# Patient Record
Sex: MALE | Race: BLACK OR AFRICAN AMERICAN | NOT HISPANIC OR LATINO | Employment: UNEMPLOYED | URBAN - METROPOLITAN AREA
[De-identification: names, ages, dates, MRNs, and addresses within clinical notes are randomized per-mention and may not be internally consistent; named-entity substitution may affect disease eponyms.]

---

## 2024-01-09 ENCOUNTER — OFFICE VISIT (OUTPATIENT)
Dept: PRIMARY CARE CLINIC | Facility: CLINIC | Age: 29
End: 2024-01-09
Payer: MEDICAID

## 2024-01-09 ENCOUNTER — LAB VISIT (OUTPATIENT)
Dept: LAB | Facility: HOSPITAL | Age: 29
End: 2024-01-09
Attending: FAMILY MEDICINE
Payer: MEDICAID

## 2024-01-09 VITALS
HEIGHT: 65 IN | SYSTOLIC BLOOD PRESSURE: 156 MMHG | OXYGEN SATURATION: 99 % | WEIGHT: 315 LBS | BODY MASS INDEX: 52.48 KG/M2 | TEMPERATURE: 98 F | DIASTOLIC BLOOD PRESSURE: 76 MMHG | HEART RATE: 55 BPM

## 2024-01-09 DIAGNOSIS — E03.9 HYPOTHYROIDISM (ACQUIRED): ICD-10-CM

## 2024-01-09 DIAGNOSIS — Z11.59 ENCOUNTER FOR HEPATITIS C SCREENING TEST FOR LOW RISK PATIENT: ICD-10-CM

## 2024-01-09 DIAGNOSIS — Z11.3 SCREEN FOR STD (SEXUALLY TRANSMITTED DISEASE): ICD-10-CM

## 2024-01-09 DIAGNOSIS — Z11.4 ENCOUNTER FOR SCREENING FOR HIV: ICD-10-CM

## 2024-01-09 DIAGNOSIS — L64.9 MALE PATTERN BALDNESS: ICD-10-CM

## 2024-01-09 DIAGNOSIS — Z13.220 LIPID SCREENING: ICD-10-CM

## 2024-01-09 DIAGNOSIS — I10 ELEVATED BLOOD PRESSURE READING WITH DIAGNOSIS OF HYPERTENSION: ICD-10-CM

## 2024-01-09 DIAGNOSIS — Z13.1 ENCOUNTER FOR SCREENING FOR DIABETES MELLITUS: ICD-10-CM

## 2024-01-09 DIAGNOSIS — G43.819 OTHER MIGRAINE WITHOUT STATUS MIGRAINOSUS, INTRACTABLE: ICD-10-CM

## 2024-01-09 DIAGNOSIS — Z00.01 ENCOUNTER FOR GENERAL ADULT MEDICAL EXAMINATION WITH ABNORMAL FINDINGS: Primary | ICD-10-CM

## 2024-01-09 DIAGNOSIS — I10 ESSENTIAL HYPERTENSION: ICD-10-CM

## 2024-01-09 DIAGNOSIS — E66.01 CLASS 3 SEVERE OBESITY WITH SERIOUS COMORBIDITY AND BODY MASS INDEX (BMI) OF 50.0 TO 59.9 IN ADULT, UNSPECIFIED OBESITY TYPE: ICD-10-CM

## 2024-01-09 PROBLEM — E66.813 CLASS 3 SEVERE OBESITY WITH SERIOUS COMORBIDITY AND BODY MASS INDEX (BMI) OF 50.0 TO 59.9 IN ADULT: Status: ACTIVE | Noted: 2024-01-09

## 2024-01-09 PROBLEM — G43.919 INTRACTABLE MIGRAINE WITHOUT STATUS MIGRAINOSUS: Status: ACTIVE | Noted: 2024-01-09

## 2024-01-09 LAB
BASOPHILS # BLD AUTO: 0.02 K/UL (ref 0–0.2)
BASOPHILS NFR BLD: 0.4 % (ref 0–1.9)
DIFFERENTIAL METHOD BLD: ABNORMAL
EOSINOPHIL # BLD AUTO: 0.1 K/UL (ref 0–0.5)
EOSINOPHIL NFR BLD: 1.4 % (ref 0–8)
ERYTHROCYTE [DISTWIDTH] IN BLOOD BY AUTOMATED COUNT: 12.2 % (ref 11.5–14.5)
ESTIMATED AVG GLUCOSE: 100 MG/DL (ref 68–131)
HBA1C MFR BLD: 5.1 % (ref 4–5.6)
HCT VFR BLD AUTO: 43.3 % (ref 40–54)
HGB BLD-MCNC: 14.1 G/DL (ref 14–18)
IMM GRANULOCYTES # BLD AUTO: 0.01 K/UL (ref 0–0.04)
IMM GRANULOCYTES NFR BLD AUTO: 0.2 % (ref 0–0.5)
LYMPHOCYTES # BLD AUTO: 2.2 K/UL (ref 1–4.8)
LYMPHOCYTES NFR BLD: 43.9 % (ref 18–48)
MCH RBC QN AUTO: 31.5 PG (ref 27–31)
MCHC RBC AUTO-ENTMCNC: 32.6 G/DL (ref 32–36)
MCV RBC AUTO: 97 FL (ref 82–98)
MONOCYTES # BLD AUTO: 0.5 K/UL (ref 0.3–1)
MONOCYTES NFR BLD: 9.3 % (ref 4–15)
NEUTROPHILS # BLD AUTO: 2.3 K/UL (ref 1.8–7.7)
NEUTROPHILS NFR BLD: 44.8 % (ref 38–73)
NRBC BLD-RTO: 0 /100 WBC
PLATELET # BLD AUTO: 367 K/UL (ref 150–450)
PMV BLD AUTO: 9.9 FL (ref 9.2–12.9)
RBC # BLD AUTO: 4.48 M/UL (ref 4.6–6.2)
WBC # BLD AUTO: 5.03 K/UL (ref 3.9–12.7)

## 2024-01-09 PROCEDURE — 86803 HEPATITIS C AB TEST: CPT | Performed by: FAMILY MEDICINE

## 2024-01-09 PROCEDURE — 80053 COMPREHEN METABOLIC PANEL: CPT | Performed by: FAMILY MEDICINE

## 2024-01-09 PROCEDURE — 1159F MED LIST DOCD IN RCRD: CPT | Mod: CPTII,,, | Performed by: FAMILY MEDICINE

## 2024-01-09 PROCEDURE — 80061 LIPID PANEL: CPT | Performed by: FAMILY MEDICINE

## 2024-01-09 PROCEDURE — 99999 PR PBB SHADOW E&M-NEW PATIENT-LVL III: CPT | Mod: PBBFAC,,, | Performed by: FAMILY MEDICINE

## 2024-01-09 PROCEDURE — 3078F DIAST BP <80 MM HG: CPT | Mod: CPTII,,, | Performed by: FAMILY MEDICINE

## 2024-01-09 PROCEDURE — 99213 OFFICE O/P EST LOW 20 MIN: CPT | Mod: S$PBB,25,, | Performed by: FAMILY MEDICINE

## 2024-01-09 PROCEDURE — 83036 HEMOGLOBIN GLYCOSYLATED A1C: CPT | Performed by: FAMILY MEDICINE

## 2024-01-09 PROCEDURE — 84439 ASSAY OF FREE THYROXINE: CPT | Performed by: FAMILY MEDICINE

## 2024-01-09 PROCEDURE — 86592 SYPHILIS TEST NON-TREP QUAL: CPT | Performed by: FAMILY MEDICINE

## 2024-01-09 PROCEDURE — 85025 COMPLETE CBC W/AUTO DIFF WBC: CPT | Performed by: FAMILY MEDICINE

## 2024-01-09 PROCEDURE — 3077F SYST BP >= 140 MM HG: CPT | Mod: CPTII,,, | Performed by: FAMILY MEDICINE

## 2024-01-09 PROCEDURE — 99203 OFFICE O/P NEW LOW 30 MIN: CPT | Mod: PBBFAC,PN | Performed by: FAMILY MEDICINE

## 2024-01-09 PROCEDURE — 84443 ASSAY THYROID STIM HORMONE: CPT | Performed by: FAMILY MEDICINE

## 2024-01-09 PROCEDURE — 36415 COLL VENOUS BLD VENIPUNCTURE: CPT | Mod: PN | Performed by: FAMILY MEDICINE

## 2024-01-09 PROCEDURE — 87389 HIV-1 AG W/HIV-1&-2 AB AG IA: CPT | Performed by: FAMILY MEDICINE

## 2024-01-09 PROCEDURE — 87340 HEPATITIS B SURFACE AG IA: CPT | Performed by: FAMILY MEDICINE

## 2024-01-09 PROCEDURE — 1160F RVW MEDS BY RX/DR IN RCRD: CPT | Mod: CPTII,,, | Performed by: FAMILY MEDICINE

## 2024-01-09 PROCEDURE — 3008F BODY MASS INDEX DOCD: CPT | Mod: CPTII,,, | Performed by: FAMILY MEDICINE

## 2024-01-09 PROCEDURE — 4010F ACE/ARB THERAPY RXD/TAKEN: CPT | Mod: CPTII,,, | Performed by: FAMILY MEDICINE

## 2024-01-09 PROCEDURE — 99385 PREV VISIT NEW AGE 18-39: CPT | Mod: S$PBB,,, | Performed by: FAMILY MEDICINE

## 2024-01-09 RX ORDER — NAPROXEN 500 MG/1
500 TABLET ORAL 2 TIMES DAILY PRN
Qty: 30 TABLET | Refills: 0 | Status: CANCELLED | OUTPATIENT
Start: 2024-01-09 | End: 2024-01-24

## 2024-01-09 RX ORDER — MINOXIDIL 50 MG/G
1 AEROSOL, FOAM TOPICAL DAILY
Qty: 60 G | Refills: 2 | Status: SHIPPED | OUTPATIENT
Start: 2024-01-09 | End: 2024-04-08

## 2024-01-09 RX ORDER — LEVOTHYROXINE SODIUM 50 UG/1
50 TABLET ORAL
Qty: 30 TABLET | Refills: 2 | Status: CANCELLED | OUTPATIENT
Start: 2024-01-09 | End: 2024-04-08

## 2024-01-09 RX ORDER — AMLODIPINE AND OLMESARTAN MEDOXOMIL 5; 20 MG/1; MG/1
1 TABLET ORAL DAILY
Qty: 30 TABLET | Refills: 2 | Status: SHIPPED | OUTPATIENT
Start: 2024-01-09 | End: 2024-04-08

## 2024-01-09 RX ORDER — LEVOTHYROXINE SODIUM 75 UG/1
75 TABLET ORAL DAILY
Qty: 30 TABLET | Refills: 2 | Status: SHIPPED | OUTPATIENT
Start: 2024-01-09 | End: 2024-04-08

## 2024-01-09 NOTE — PROGRESS NOTES
Subjective:       Patient ID: Augusto Rutledge is a 28 y.o. male.    Chief Complaint: Establish Care (Migraines/) and Hypertension (Haven't had medication in 4 months (Levothyroxine and amlodipine) not sure mg)      History of Present Illness:   Augusto Rutledge 28 y.o. male presents today with   Here with is mother to establish care.      Past Medical History:   Diagnosis Date    ADHD (attention deficit hyperactivity disorder)     Anxiety     Depression     Heart murmur     Hypertension     Thyroid disease      Family History   Problem Relation Age of Onset    Depression Mother     Arthritis Mother     Hypertension Mother      Social History     Socioeconomic History    Marital status: Single   Tobacco Use    Smoking status: Never     Passive exposure: Never    Smokeless tobacco: Never   Substance and Sexual Activity    Alcohol use: Yes     Comment: occ    Drug use: Never    Sexual activity: Not Currently     Outpatient Encounter Medications as of 1/9/2024   Medication Sig Dispense Refill    amlodipine-olmesartan (CHERIE) 5-20 mg per tablet Take 1 tablet by mouth once daily. 30 tablet 2    levothyroxine (SYNTHROID) 75 MCG tablet Take 1 tablet (75 mcg total) by mouth once daily. 30 tablet 2    minoxidiL 5 % Foam Apply 1 g topically once daily. 60 g 2     No facility-administered encounter medications on file as of 1/9/2024.       Review of Systems   Constitutional:  Negative for appetite change and fever.   HENT:  Negative for congestion, facial swelling and voice change.    Eyes:  Negative for discharge and itching.   Respiratory:  Negative for cough, chest tightness and wheezing.    Cardiovascular: Negative.  Negative for chest pain and leg swelling.   Gastrointestinal:  Negative for abdominal pain, nausea and vomiting.   Endocrine: Negative for cold intolerance and heat intolerance.   Genitourinary:  Negative for dysuria and flank pain.   Musculoskeletal:  Negative for myalgias and neck stiffness.   Skin:  Negative for pallor  "and rash.   Neurological:  Negative for facial asymmetry and weakness.   Psychiatric/Behavioral:  Negative for agitation and confusion.        Objective:      BP (!) 156/76 (BP Location: Left arm, Patient Position: Sitting, BP Method: Large (Manual))   Pulse (!) 55   Temp 98.1 °F (36.7 °C)   Ht 5' 5" (1.651 m)   Wt (!) 147.2 kg (324 lb 9.6 oz)   SpO2 99%   BMI 54.02 kg/m²   Physical Exam  Vitals and nursing note reviewed.   Constitutional:       General: He is not in acute distress.     Appearance: He is well-developed.   HENT:      Head: Normocephalic and atraumatic.        Right Ear: External ear normal.      Left Ear: External ear normal.   Eyes:      Conjunctiva/sclera: Conjunctivae normal.   Neck:      Thyroid: No thyromegaly.   Cardiovascular:      Rate and Rhythm: Normal rate and regular rhythm.      Pulses: Normal pulses.      Heart sounds: Normal heart sounds. No murmur heard.  Pulmonary:      Effort: Pulmonary effort is normal. No respiratory distress.      Breath sounds: Normal breath sounds.   Genitourinary:     Comments: deferred  Musculoskeletal:         General: No deformity.      Cervical back: Neck supple.   Lymphadenopathy:      Head:      Right side of head: No submandibular adenopathy.      Left side of head: No submandibular adenopathy.      Cervical: No cervical adenopathy.   Skin:     General: Skin is warm and dry.   Neurological:      Mental Status: He is alert and oriented to person, place, and time.   Psychiatric:         Behavior: Behavior normal.         No results found for this or any previous visit.  Assessment:       1. Encounter for general adult medical examination with abnormal findings    2. Lipid screening    3. Encounter for screening for HIV    4. Encounter for hepatitis C screening test for low risk patient    5. Screen for STD (sexually transmitted disease)    6. Encounter for screening for diabetes mellitus    7. Hypothyroidism (acquired)    8. Elevated blood pressure " reading with diagnosis of hypertension    9. Essential hypertension    10. Male pattern baldness    11. Class 3 severe obesity with serious comorbidity and body mass index (BMI) of 50.0 to 59.9 in adult, unspecified obesity type    12. BMI 50.0-59.9, adult    13. Other migraine without status migrainosus, intractable        Plan:   1. Encounter for general adult medical examination with abnormal findings  Comments:  elevated blood pressre, Hyppothyroidism and male pattern baldness    2. Lipid screening  -     Lipid Panel; Future; Expected date: 01/09/2024    3. Encounter for screening for HIV  -     HIV 1/2 Ag/Ab (4th Gen); Future; Expected date: 01/09/2024    4. Encounter for hepatitis C screening test for low risk patient  -     Hepatitis C Antibody; Future; Expected date: 01/09/2024    5. Screen for STD (sexually transmitted disease)  -     RPR; Future; Expected date: 01/09/2024  -     C. trachomatis/N. gonorrhoeae by AMP DNA; Future; Expected date: 01/09/2024  -     Hepatitis B Surface Antigen; Future; Expected date: 01/09/2024    6. Encounter for screening for diabetes mellitus  -     Hemoglobin A1C; Future; Expected date: 01/09/2024    7. Hypothyroidism (acquired)  Overview:  Chronic, has not been on med for months. Update lab and restart med.    Orders:  -     TSH; Future; Expected date: 01/09/2024  -     T4, FREE; Future; Expected date: 01/09/2024  -     levothyroxine (SYNTHROID) 75 MCG tablet; Take 1 tablet (75 mcg total) by mouth once daily.  Dispense: 30 tablet; Refill: 2    8. Elevated blood pressure reading with diagnosis of hypertension  Overview:  He has not been on med for months, restart med and n monitor closely, dash diet recommended.    Orders:  -     Comprehensive Metabolic Panel; Future; Expected date: 01/09/2024  -     CBC Auto Differential; Future; Expected date: 01/09/2024  -     TSH; Future; Expected date: 01/09/2024  -     Microalbumin/Creatinine Ratio, Urine; Future; Expected date:  01/09/2024  -     Cancel: EKG 12-lead; Future; Expected date: 01/09/2024  -     Toxicology Screen, Urine; Future; Expected date: 01/09/2024    9. Essential hypertension  Overview:  Uncontrolled, new med added today, amlo-olmesartan    Orders:  -     amlodipine-olmesartan (CHERIE) 5-20 mg per tablet; Take 1 tablet by mouth once daily.  Dispense: 30 tablet; Refill: 2    10. Male pattern baldness  Overview:  Reports that baldness bothers him and would like to be on medication.  He has not tried anything yet, will try topical for 3 months and then add oral if needed.    Orders:  -     minoxidiL 5 % Foam; Apply 1 g topically once daily.  Dispense: 60 g; Refill: 2    11. Class 3 severe obesity with serious comorbidity and body mass index (BMI) of 50.0 to 59.9 in adult, unspecified obesity type  Overview:  Maintain/Continue a heathy lifestyle.  Choose a diet rich in fruits, vegetables, and low-fat dairy products, but low in meats, sweets, and refined grains   Be more active. If you are able, walk for 35 mins 5 times a week.          12. BMI 50.0-59.9, adult    13. Other migraine without status migrainosus, intractable  Overview:  Diagnosed as a teenager.  Asso with photophobia and phonophobia, triggered by stress and nothing helps except laying down.  Symptomatic therapy-dim lights and cool washcloth.          I have reviewed all of the patient's clinical history available in care everywhere and Epic and have utilized this in my evaluation and management recommendations today.      Treatment options and alternatives were discussed with the patient. Patient was given ample time to ask questions. All questions were answered. Voices understanding and acceptance of this advice. Will call back if any further questions or concerns.                Honey Wyatt MD  [facility], [place]

## 2024-01-10 LAB
ALBUMIN SERPL BCP-MCNC: 3.9 G/DL (ref 3.5–5.2)
ALP SERPL-CCNC: 64 U/L (ref 55–135)
ALT SERPL W/O P-5'-P-CCNC: 14 U/L (ref 10–44)
ANION GAP SERPL CALC-SCNC: 10 MMOL/L (ref 8–16)
AST SERPL-CCNC: 19 U/L (ref 10–40)
BILIRUB SERPL-MCNC: 0.4 MG/DL (ref 0.1–1)
BUN SERPL-MCNC: 9 MG/DL (ref 6–20)
CALCIUM SERPL-MCNC: 9.2 MG/DL (ref 8.7–10.5)
CHLORIDE SERPL-SCNC: 104 MMOL/L (ref 95–110)
CHOLEST SERPL-MCNC: 204 MG/DL (ref 120–199)
CHOLEST/HDLC SERPL: 3.7 {RATIO} (ref 2–5)
CO2 SERPL-SCNC: 24 MMOL/L (ref 23–29)
CREAT SERPL-MCNC: 0.8 MG/DL (ref 0.5–1.4)
EST. GFR  (NO RACE VARIABLE): >60 ML/MIN/1.73 M^2
GLUCOSE SERPL-MCNC: 81 MG/DL (ref 70–110)
HBV SURFACE AG SERPL QL IA: NORMAL
HCV AB SERPL QL IA: NORMAL
HDLC SERPL-MCNC: 55 MG/DL (ref 40–75)
HDLC SERPL: 27 % (ref 20–50)
HIV 1+2 AB+HIV1 P24 AG SERPL QL IA: NORMAL
LDLC SERPL CALC-MCNC: 133 MG/DL (ref 63–159)
NONHDLC SERPL-MCNC: 149 MG/DL
POTASSIUM SERPL-SCNC: 3.8 MMOL/L (ref 3.5–5.1)
PROT SERPL-MCNC: 7.9 G/DL (ref 6–8.4)
RPR SER QL: NORMAL
SODIUM SERPL-SCNC: 138 MMOL/L (ref 136–145)
T4 FREE SERPL-MCNC: 0.92 NG/DL (ref 0.71–1.51)
TRIGL SERPL-MCNC: 80 MG/DL (ref 30–150)
TSH SERPL DL<=0.005 MIU/L-ACNC: 3.27 UIU/ML (ref 0.4–4)

## 2024-01-16 ENCOUNTER — TELEPHONE (OUTPATIENT)
Dept: PRIMARY CARE CLINIC | Facility: CLINIC | Age: 29
End: 2024-01-16
Payer: MEDICAID

## 2024-01-25 ENCOUNTER — TELEPHONE (OUTPATIENT)
Dept: PRIMARY CARE CLINIC | Facility: CLINIC | Age: 29
End: 2024-01-25
Payer: MEDICAID

## 2024-01-25 NOTE — TELEPHONE ENCOUNTER
Called the patient to inform the patient of results no answer LVM.          ----- Message from Honey Wyatt MD sent at 1/23/2024  8:46 PM CST -----  I have reviewed all your lab results.   Blood count, kidney function, liver function, electrolytes, and thyroid function are all normal.  Your A1C is normal, therefore you do not have diabetes.  Yearly HIV, Hep B, Hep C, Syphilis screen are negative.    Your cholesterol is borderline high  Maintain/Continue a heathy lifestyle.  Be more active. If you are able, walk for 35 mins 5 times a week.      Please do not hesitate to contact us if you have any questions.

## 2024-01-26 ENCOUNTER — CLINICAL SUPPORT (OUTPATIENT)
Dept: PRIMARY CARE CLINIC | Facility: CLINIC | Age: 29
End: 2024-01-26
Payer: MEDICAID

## 2024-01-26 VITALS — DIASTOLIC BLOOD PRESSURE: 74 MMHG | SYSTOLIC BLOOD PRESSURE: 124 MMHG

## 2024-01-26 DIAGNOSIS — I10 ESSENTIAL HYPERTENSION: Primary | ICD-10-CM

## 2024-01-26 DIAGNOSIS — G43.819 OTHER MIGRAINE WITHOUT STATUS MIGRAINOSUS, INTRACTABLE: Primary | ICD-10-CM

## 2024-01-26 RX ORDER — NAPROXEN 500 MG/1
500 TABLET ORAL 2 TIMES DAILY PRN
Qty: 30 TABLET | Refills: 0 | Status: SHIPPED | OUTPATIENT
Start: 2024-01-26 | End: 2024-03-18

## 2024-01-26 NOTE — PROGRESS NOTES
Patient presented for BP. Bp 124/74. Patient released and will follow up as needed. He voiced understanding. Provider notified.

## 2024-02-13 ENCOUNTER — TELEPHONE (OUTPATIENT)
Dept: PRIMARY CARE CLINIC | Facility: CLINIC | Age: 29
End: 2024-02-13
Payer: MEDICAID

## 2024-02-13 NOTE — TELEPHONE ENCOUNTER
Call the patient to inform the patient of labs no answer LVM.          ----- Message from Honey Wyatt MD sent at 1/23/2024  8:46 PM CST -----  I have reviewed all your lab results.   Blood count, kidney function, liver function, electrolytes, and thyroid function are all normal.  Your A1C is normal, therefore you do not have diabetes.  Yearly HIV, Hep B, Hep C, Syphilis screen are negative.    Your cholesterol is borderline high  Maintain/Continue a heathy lifestyle.  Be more active. If you are able, walk for 35 mins 5 times a week.      Please do not hesitate to contact us if you have any questions.

## 2024-02-22 ENCOUNTER — TELEPHONE (OUTPATIENT)
Dept: PRIMARY CARE CLINIC | Facility: CLINIC | Age: 29
End: 2024-02-22
Payer: MEDICAID

## 2024-02-22 NOTE — TELEPHONE ENCOUNTER
Call placed to inform patient of lab results no answer to call. Voice mail left, third attempt a letter also has been sent to the patient to contact clinic.          ----- Message from Honey Wyatt MD sent at 1/23/2024  8:46 PM CST -----  I have reviewed all your lab results.   Blood count, kidney function, liver function, electrolytes, and thyroid function are all normal.  Your A1C is normal, therefore you do not have diabetes.  Yearly HIV, Hep B, Hep C, Syphilis screen are negative.    Your cholesterol is borderline high  Maintain/Continue a heathy lifestyle.  Be more active. If you are able, walk for 35 mins 5 times a week.      Please do not hesitate to contact us if you have any questions.

## 2024-03-07 ENCOUNTER — OFFICE VISIT (OUTPATIENT)
Dept: URGENT CARE | Facility: CLINIC | Age: 29
End: 2024-03-07
Payer: MEDICAID

## 2024-03-07 VITALS
HEART RATE: 55 BPM | WEIGHT: 315 LBS | TEMPERATURE: 99 F | BODY MASS INDEX: 50.62 KG/M2 | SYSTOLIC BLOOD PRESSURE: 131 MMHG | OXYGEN SATURATION: 100 % | DIASTOLIC BLOOD PRESSURE: 60 MMHG | HEIGHT: 66 IN | RESPIRATION RATE: 16 BRPM

## 2024-03-07 DIAGNOSIS — R00.1 BRADYCARDIA: ICD-10-CM

## 2024-03-07 DIAGNOSIS — I10 ELEVATED BLOOD PRESSURE READING WITH DIAGNOSIS OF HYPERTENSION: ICD-10-CM

## 2024-03-07 DIAGNOSIS — R19.7 DIARRHEA, UNSPECIFIED TYPE: ICD-10-CM

## 2024-03-07 DIAGNOSIS — R11.0 NAUSEA: ICD-10-CM

## 2024-03-07 DIAGNOSIS — R42 ORTHOSTATIC DIZZINESS: Primary | ICD-10-CM

## 2024-03-07 PROCEDURE — 93005 ELECTROCARDIOGRAM TRACING: CPT | Mod: S$GLB,,, | Performed by: PHYSICIAN ASSISTANT

## 2024-03-07 PROCEDURE — 93010 ELECTROCARDIOGRAM REPORT: CPT | Mod: S$GLB,,, | Performed by: INTERNAL MEDICINE

## 2024-03-07 PROCEDURE — 99214 OFFICE O/P EST MOD 30 MIN: CPT | Mod: S$GLB,,, | Performed by: PHYSICIAN ASSISTANT

## 2024-03-07 RX ORDER — ONDANSETRON 4 MG/1
4 TABLET, ORALLY DISINTEGRATING ORAL
Status: COMPLETED | OUTPATIENT
Start: 2024-03-07 | End: 2024-03-07

## 2024-03-07 RX ORDER — ONDANSETRON 4 MG/1
4 TABLET, ORALLY DISINTEGRATING ORAL EVERY 6 HOURS PRN
Qty: 12 TABLET | Refills: 0 | Status: SHIPPED | OUTPATIENT
Start: 2024-03-07

## 2024-03-07 RX ADMIN — ONDANSETRON 4 MG: 4 TABLET, ORALLY DISINTEGRATING ORAL at 06:03

## 2024-03-07 NOTE — LETTER
March 7, 2024      [facility]  [address]  [facility] [address]  Phone: [phone]       Patient: Augusto Rutledge   YOB: 1995  Date of Visit: 03/07/2024    To Whom It May Concern:    Hermes Rutledge  was at [facility] on 03/07/2024. The patient may return to work/school on 03/08/24 with no restrictions. If you have any questions or concerns, or if I can be of further assistance, please do not hesitate to contact me.    Sincerely,    Sherrill Bardales PA-C

## 2024-03-07 NOTE — LETTER
March 7, 2024      [facility]  [address]  [facility] [address]  Phone: [phone]       Patient: Augusto Rutledge   YOB: 1995  Date of Visit: 03/07/2024    To Whom It May Concern:    Hermes Rutledge  was at [facility] on 03/07/2024. The patient may return to work/school on 03/09/24 with no restrictions. If you have any questions or concerns, or if I can be of further assistance, please do not hesitate to contact me.    Sincerely,    Sherrill Bardales PA-C

## 2024-03-08 LAB
OHS QRS DURATION: 96 MS
OHS QTC CALCULATION: 430 MS

## 2024-03-08 NOTE — PROGRESS NOTES
"Subjective:      Patient ID: Augusto Rutledge is a 28 y.o. male.    Vitals:  height is 5' 5.59" (1.666 m) and weight is 145.4 kg (320 lb 7 oz) (abnormal). His tympanic temperature is 98.5 °F (36.9 °C). His blood pressure is 131/60 and his pulse is 55 (abnormal). His respiration is 16 and oxygen saturation is 100%.     Chief Complaint: Dizziness (/)    History provided by pt and his mother. Presents with dizziness, nausea and diarrhea. Diarrhea started on 03/05/24. Pt's mother believes this is side effect from levothyroxine, because patient recently had his dose increased from 50 mg to 75 mg, and last time it was increased he had diarrhea and had to be lowered back down.  Patient states that diarrhea has been better today however is still having nausea.  Dizziness started yesterday and only occurs when standing.  He describes dizziness as lightheaded and feeling off balance.  Reports that dizziness is very brief and typically goes away with rest or if he holds onto something. Pt does have hx of migraines, takes naproxen prn. Has migraine yesterday with 1 episode of vomiting but that resolved. He states he has not eaten much today due to nausea and fear of having diarrhea. Only drinks about 1 bottle of water per day. Pt denies any chest pain, shortness on breath, palpitations, syncope, focal weakness, abdominal pain, bloody stools, fever/chills or uri symptoms. Denies suspicious food intake, travel, or abx use.  No hx of vertigo. Requesting work note.     Dizziness:   Chronicity:  New  Onset:  Yesterday  Progression since onset:  Unchanged  Pain Scale:  0/10  Duration:  Very brief  Dizziness characteristics:  Sensation of movement and lightheaded/impending faint   Associated symptoms: headaches, nausea and light-headedness.no hearing loss, no ear pain, no fever, no tinnitus, no vomiting, no diaphoresis, no weakness, no visual disturbances, no syncope, no palpitations, no facial weakness, no slurred speech, no numbness in " extremities and no chest pain.  Aggravated by:  Getting up  Risk factors:  Family history of inner ear  Treatments tried:  Rest  Improvements on treatment:  Mildno strokes, no cardiac surgery, no neurologic disease, no head trauma, no ear trauma, no ear surgery, no head trauma and no ear infections.    Constitution: Negative for chills, sweating and fever.   HENT:  Negative for ear pain, tinnitus, hearing loss and congestion.    Neck: neck negative.   Cardiovascular:  Negative for chest pain, leg swelling, palpitations, sob on exertion and passing out.   Eyes: Negative.    Respiratory: Negative.     Gastrointestinal:  Positive for nausea and diarrhea. Negative for abdominal pain, vomiting, bright red blood in stool and dark colored stools.   Genitourinary: Negative.    Musculoskeletal: Negative.    Skin: Negative.    Neurological:  Positive for dizziness, light-headedness, headaches and history of migraines. Negative for history of vertigo, passing out, facial drooping, speech difficulty, loss of consciousness, numbness and tingling.      Objective:     Physical Exam   Constitutional: He is oriented to person, place, and time. He appears well-developed.  Non-toxic appearance. He does not appear ill. No distress. obesity  HENT:   Head: Normocephalic and atraumatic.   Ears:   Right Ear: Tympanic membrane, external ear and ear canal normal.   Left Ear: Tympanic membrane, external ear and ear canal normal.   Nose: Nose normal.   Eyes: Conjunctivae and EOM are normal. Pupils are equal, round, and reactive to light. Extraocular movement intact   Neck: Neck supple.   Cardiovascular: Regular rhythm and normal heart sounds. Bradycardia present.   Pulmonary/Chest: Effort normal and breath sounds normal.   Abdominal: Normal appearance and bowel sounds are normal. He exhibits no distension. Soft. protuberant There is no abdominal tenderness. There is no rebound and no guarding.   Musculoskeletal: Normal range of motion.          General: Normal range of motion.   Neurological: no focal deficit. He is alert and oriented to person, place, and time. He displays no weakness. No cranial nerve deficit. He has a normal Finger-Nose-Finger Test. He shows no pronator drift. Coordination and gait normal.   Skin: Skin is warm, dry, not diaphoretic, not pale and no rash.   Psychiatric: His behavior is normal. Mood normal.     The EKG shows a sinus bradycardia with sinus arrhythmia. Cannot rule out anterior infarct, age undetermined. No STEMI. HR of 55. There is not a previous EKG for comparison. This EKG was interpreted by me.    Assessment:     1. Orthostatic dizziness    2. Nausea    3. Diarrhea, unspecified type    4. Elevated blood pressure reading with diagnosis of hypertension    5. Bradycardia        Plan:     DDX dehydration, viral syndrome, vertigo, orthostatic hypotension, migraine, side effect of medication. Main complaint at this time is brief dizziness upon standing and intermittent nausea.  Patient states that headache and diarrhea have improved.  Neurologically intact on exam.  Denies any symptoms of acute cardiac event such as chest pain, shortness for breath, leg swelling, palpitations.  He reports that he feels better after given Zofran.  Patient advised to continue taking Zofran at home as needed. Discussed the importance of adequate hydration. Alternate between water and an electrolyte replacement beverage (pedialyte, pedialyte popsicles). Eat a bland diet as tolerated. BRAT diet for diarrhea. Avoid heavily seasoned, spicy, or fatty foods.     Pt had HR of 55 bpm at recent PCP visit. May be baseline for him. Recommend to discuss further with PCP if continues.     Strict ED precautions for any new or worsening symptoms.  Red flag warning signs and symptoms printed in AVS.  Orthostatic dizziness  -     EKG 12-lead  -     Orthostatic vital signs  -     ondansetron (ZOFRAN-ODT) 4 MG TbDL; Take 1 tablet (4 mg total) by mouth every 6 (six)  hours as needed (nausea/vomiting).  Dispense: 12 tablet; Refill: 0    Nausea  -     ondansetron disintegrating tablet 4 mg  -     ondansetron (ZOFRAN-ODT) 4 MG TbDL; Take 1 tablet (4 mg total) by mouth every 6 (six) hours as needed (nausea/vomiting).  Dispense: 12 tablet; Refill: 0    Diarrhea, unspecified type    Elevated blood pressure reading with diagnosis of hypertension  -     EKG 12-lead  -     Orthostatic vital signs    Bradycardia  -     EKG 12-lead  -     Orthostatic vital signs

## 2024-03-11 ENCOUNTER — HOSPITAL ENCOUNTER (EMERGENCY)
Facility: HOSPITAL | Age: 29
Discharge: HOME | End: 2024-03-11
Attending: EMERGENCY MEDICINE
Payer: MEDICAID

## 2024-03-11 VITALS
TEMPERATURE: 98 F | BODY MASS INDEX: 52.46 KG/M2 | WEIGHT: 315 LBS | HEART RATE: 60 BPM | RESPIRATION RATE: 18 BRPM | DIASTOLIC BLOOD PRESSURE: 65 MMHG | SYSTOLIC BLOOD PRESSURE: 141 MMHG | OXYGEN SATURATION: 100 %

## 2024-03-11 DIAGNOSIS — R53.1 WEAKNESS: ICD-10-CM

## 2024-03-11 DIAGNOSIS — R42 DIZZINESS: Primary | ICD-10-CM

## 2024-03-11 DIAGNOSIS — F41.9 ANXIETY: ICD-10-CM

## 2024-03-11 LAB
ALBUMIN SERPL BCP-MCNC: 3.8 G/DL (ref 3.5–5.2)
ALP SERPL-CCNC: 64 U/L (ref 55–135)
ALT SERPL W/O P-5'-P-CCNC: 10 U/L (ref 10–44)
ANION GAP SERPL CALC-SCNC: 11 MMOL/L (ref 8–16)
AST SERPL-CCNC: 19 U/L (ref 10–40)
BASOPHILS # BLD AUTO: 0.04 K/UL (ref 0–0.2)
BASOPHILS NFR BLD: 0.7 % (ref 0–1.9)
BILIRUB SERPL-MCNC: 0.5 MG/DL (ref 0.1–1)
BILIRUB UR QL STRIP: NEGATIVE
BNP SERPL-MCNC: 20 PG/ML (ref 0–99)
BUN SERPL-MCNC: 7 MG/DL (ref 6–20)
CALCIUM SERPL-MCNC: 8.8 MG/DL (ref 8.7–10.5)
CHLORIDE SERPL-SCNC: 106 MMOL/L (ref 95–110)
CK SERPL-CCNC: 138 U/L (ref 20–200)
CLARITY UR: CLEAR
CO2 SERPL-SCNC: 22 MMOL/L (ref 23–29)
COLOR UR: YELLOW
CREAT SERPL-MCNC: 0.9 MG/DL (ref 0.5–1.4)
DIFFERENTIAL METHOD BLD: ABNORMAL
EOSINOPHIL # BLD AUTO: 0.1 K/UL (ref 0–0.5)
EOSINOPHIL NFR BLD: 1.2 % (ref 0–8)
ERYTHROCYTE [DISTWIDTH] IN BLOOD BY AUTOMATED COUNT: 12.1 % (ref 11.5–14.5)
EST. GFR  (NO RACE VARIABLE): >60 ML/MIN/1.73 M^2
GLUCOSE SERPL-MCNC: 86 MG/DL (ref 70–110)
GLUCOSE UR QL STRIP: NEGATIVE
HCT VFR BLD AUTO: 45.8 % (ref 40–54)
HGB BLD-MCNC: 15.3 G/DL (ref 14–18)
HGB UR QL STRIP: NEGATIVE
IMM GRANULOCYTES # BLD AUTO: 0.02 K/UL (ref 0–0.04)
IMM GRANULOCYTES NFR BLD AUTO: 0.3 % (ref 0–0.5)
KETONES UR QL STRIP: NEGATIVE
LEUKOCYTE ESTERASE UR QL STRIP: NEGATIVE
LYMPHOCYTES # BLD AUTO: 2.5 K/UL (ref 1–4.8)
LYMPHOCYTES NFR BLD: 42.5 % (ref 18–48)
MCH RBC QN AUTO: 31.2 PG (ref 27–31)
MCHC RBC AUTO-ENTMCNC: 33.4 G/DL (ref 32–36)
MCV RBC AUTO: 93 FL (ref 82–98)
MONOCYTES # BLD AUTO: 0.4 K/UL (ref 0.3–1)
MONOCYTES NFR BLD: 6.8 % (ref 4–15)
NEUTROPHILS # BLD AUTO: 2.8 K/UL (ref 1.8–7.7)
NEUTROPHILS NFR BLD: 48.5 % (ref 38–73)
NITRITE UR QL STRIP: NEGATIVE
NRBC BLD-RTO: 0 /100 WBC
OHS QRS DURATION: 92 MS
OHS QTC CALCULATION: 420 MS
PH UR STRIP: 7 [PH] (ref 5–8)
PLATELET # BLD AUTO: 353 K/UL (ref 150–450)
PMV BLD AUTO: 9.3 FL (ref 9.2–12.9)
POTASSIUM SERPL-SCNC: 4.2 MMOL/L (ref 3.5–5.1)
PROT SERPL-MCNC: 7.8 G/DL (ref 6–8.4)
PROT UR QL STRIP: ABNORMAL
RBC # BLD AUTO: 4.91 M/UL (ref 4.6–6.2)
SODIUM SERPL-SCNC: 139 MMOL/L (ref 136–145)
SP GR UR STRIP: 1.02 (ref 1–1.03)
TROPONIN I SERPL DL<=0.01 NG/ML-MCNC: <0.006 NG/ML (ref 0–0.03)
TSH SERPL DL<=0.005 MIU/L-ACNC: 2.38 UIU/ML (ref 0.4–4)
URN SPEC COLLECT METH UR: ABNORMAL
UROBILINOGEN UR STRIP-ACNC: NEGATIVE EU/DL
WBC # BLD AUTO: 5.76 K/UL (ref 3.9–12.7)

## 2024-03-11 PROCEDURE — 81003 URINALYSIS AUTO W/O SCOPE: CPT | Performed by: EMERGENCY MEDICINE

## 2024-03-11 PROCEDURE — 82550 ASSAY OF CK (CPK): CPT | Performed by: EMERGENCY MEDICINE

## 2024-03-11 PROCEDURE — 93010 ELECTROCARDIOGRAM REPORT: CPT | Mod: ,,, | Performed by: INTERNAL MEDICINE

## 2024-03-11 PROCEDURE — 83880 ASSAY OF NATRIURETIC PEPTIDE: CPT | Performed by: EMERGENCY MEDICINE

## 2024-03-11 PROCEDURE — 84443 ASSAY THYROID STIM HORMONE: CPT | Performed by: EMERGENCY MEDICINE

## 2024-03-11 PROCEDURE — 85025 COMPLETE CBC W/AUTO DIFF WBC: CPT | Performed by: EMERGENCY MEDICINE

## 2024-03-11 PROCEDURE — 93005 ELECTROCARDIOGRAM TRACING: CPT

## 2024-03-11 PROCEDURE — 84484 ASSAY OF TROPONIN QUANT: CPT | Performed by: EMERGENCY MEDICINE

## 2024-03-11 PROCEDURE — 80053 COMPREHEN METABOLIC PANEL: CPT | Performed by: EMERGENCY MEDICINE

## 2024-03-11 PROCEDURE — 99285 EMERGENCY DEPT VISIT HI MDM: CPT | Mod: 25

## 2024-03-11 RX ORDER — HYDROXYZINE PAMOATE 25 MG/1
25 CAPSULE ORAL 4 TIMES DAILY
Qty: 30 CAPSULE | Refills: 0 | Status: SHIPPED | OUTPATIENT
Start: 2024-03-11

## 2024-03-11 NOTE — ED PROVIDER NOTES
SCRIBE #1 NOTE: I, Juan Carlos Cummings, am scribing for, and in the presence of, Darryl Alfonso MD. I have scribed the entire note.      History      Chief Complaint   Patient presents with    Dizziness    Chest Pain     Chest pressure and dizziness that started today. Pt reports not being compliant with HTN and thyroid medications.       Review of patient's allergies indicates:  No Known Allergies     HPI   HPI    3/11/2024, 11:28 AM   History obtained from the patient      History of Present Illness: Augusto Rutledge is a 28 y.o. male patient who presents to the Emergency Department for dizziness, onset this morning. Symptoms are constant and moderate in severity. No mitigating or exacerbating factors reported. Associated sxs include chest pain. Patient denies any fever, chills, n/v/d, SOB, weakness, numbness, headache, and all other sxs at this time. Pt notes that he has not been compliant with his HTN or thyroid medications. No further complaints or concerns at this time.     Arrival mode: EMS    PCP: Honey Wyatt MD       Past Medical History:  Past Medical History:   Diagnosis Date    ADHD (attention deficit hyperactivity disorder)     Anxiety     Depression     Heart murmur     Hypertension     Thyroid disease        Past Surgical History:  Past Surgical History:   Procedure Laterality Date    EYE SURGERY           Family History:  Family History   Problem Relation Age of Onset    Depression Mother     Arthritis Mother     Hypertension Mother        Social History:  Social History     Tobacco Use    Smoking status: Never     Passive exposure: Never    Smokeless tobacco: Never   Substance and Sexual Activity    Alcohol use: Yes     Comment: occ    Drug use: Never    Sexual activity: Not Currently       ROS   Review of Systems   Constitutional:  Negative for chills and fever.   HENT:  Negative for sore throat.    Respiratory:  Negative for shortness of breath.    Cardiovascular:  Positive for chest pain.    Gastrointestinal:  Negative for diarrhea, nausea and vomiting.   Genitourinary:  Negative for dysuria.   Musculoskeletal:  Negative for back pain.   Skin:  Negative for rash.   Neurological:  Positive for dizziness. Negative for weakness, numbness and headaches.   Hematological:  Does not bruise/bleed easily.   All other systems reviewed and are negative.    Physical Exam      Initial Vitals [03/11/24 1114]   BP Pulse Resp Temp SpO2   127/74 62 16 98 °F (36.7 °C) 100 %      MAP       --          Physical Exam  Nursing Notes and Vital Signs Reviewed.  Constitutional: Patient is in no acute distress. Well-developed and well-nourished.  Head: Atraumatic. Normocephalic.  Eyes: PERRL. EOM intact. Conjunctivae are not pale. No scleral icterus.  ENT: Mucous membranes are moist. Oropharynx is clear and symmetric.    Neck: Supple. Full ROM.   Cardiovascular: Regular rate. Regular rhythm. No murmurs, rubs, or gallops. Distal pulses are 2+ and symmetric.  Pulmonary/Chest: No respiratory distress. Clear to auscultation bilaterally. No wheezing or rales.  Abdominal: Soft and non-distended.  There is no tenderness.  No rebound, guarding, or rigidity.   Musculoskeletal: Moves all extremities. No obvious deformities. No edema.  Skin: Warm and dry.  Neurological:  Alert, awake, and appropriate.  Normal speech.  No acute focal neurological deficits are appreciated.  Psychiatric: Normal affect. Good eye contact. Appropriate in content.    ED Course    Procedures  ED Vital Signs:  Vitals:    03/11/24 1114 03/11/24 1200 03/11/24 1350 03/11/24 1351   BP: 127/74 (!) 136/58  (!) 149/71   Pulse: 62 64 61    Resp: 16 (!) 21     Temp: 98 °F (36.7 °C)      TempSrc: Oral      SpO2: 100% 100%     Weight:        03/11/24 1353 03/11/24 1355   BP: (!) 182/88 (!) 196/93   Pulse:     Resp:     Temp:     TempSrc:     SpO2:     Weight:  (!) 145.6 kg (320 lb 15.8 oz)       Abnormal Lab Results:  Labs Reviewed   COMPREHENSIVE METABOLIC PANEL -  Abnormal; Notable for the following components:       Result Value    CO2 22 (*)     All other components within normal limits   URINALYSIS, REFLEX TO URINE CULTURE - Abnormal; Notable for the following components:    Protein, UA Trace (*)     All other components within normal limits    Narrative:     Specimen Source->Urine   CBC W/ AUTO DIFFERENTIAL - Abnormal; Notable for the following components:    MCH 31.2 (*)     All other components within normal limits   B-TYPE NATRIURETIC PEPTIDE   CK   TROPONIN I   TSH        All Lab Results:  Results for orders placed or performed during the hospital encounter of 03/11/24   Comprehensive Metabolic Panel   Result Value Ref Range    Sodium 139 136 - 145 mmol/L    Potassium 4.2 3.5 - 5.1 mmol/L    Chloride 106 95 - 110 mmol/L    CO2 22 (L) 23 - 29 mmol/L    Glucose 86 70 - 110 mg/dL    BUN 7 6 - 20 mg/dL    Creatinine 0.9 0.5 - 1.4 mg/dL    Calcium 8.8 8.7 - 10.5 mg/dL    Total Protein 7.8 6.0 - 8.4 g/dL    Albumin 3.8 3.5 - 5.2 g/dL    Total Bilirubin 0.5 0.1 - 1.0 mg/dL    Alkaline Phosphatase 64 55 - 135 U/L    AST 19 10 - 40 U/L    ALT 10 10 - 44 U/L    eGFR >60 >60 mL/min/1.73 m^2    Anion Gap 11 8 - 16 mmol/L   Urinalysis, Reflex to Urine Culture Urine, Clean Catch    Specimen: Urine   Result Value Ref Range    Specimen UA Urine, Clean Catch     Color, UA Yellow Yellow, Straw, Bia    Appearance, UA Clear Clear    pH, UA 7.0 5.0 - 8.0    Specific Gravity, UA 1.020 1.005 - 1.030    Protein, UA Trace (A) Negative    Glucose, UA Negative Negative    Ketones, UA Negative Negative    Bilirubin (UA) Negative Negative    Occult Blood UA Negative Negative    Nitrite, UA Negative Negative    Urobilinogen, UA Negative <2.0 EU/dL    Leukocytes, UA Negative Negative   BNP   Result Value Ref Range    BNP 20 0 - 99 pg/mL   CK   Result Value Ref Range     20 - 200 U/L   Troponin I   Result Value Ref Range    Troponin I <0.006 0.000 - 0.026 ng/mL   TSH   Result Value Ref  Range    TSH 2.378 0.400 - 4.000 uIU/mL   CBC auto differential   Result Value Ref Range    WBC 5.76 3.90 - 12.70 K/uL    RBC 4.91 4.60 - 6.20 M/uL    Hemoglobin 15.3 14.0 - 18.0 g/dL    Hematocrit 45.8 40.0 - 54.0 %    MCV 93 82 - 98 fL    MCH 31.2 (H) 27.0 - 31.0 pg    MCHC 33.4 32.0 - 36.0 g/dL    RDW 12.1 11.5 - 14.5 %    Platelets 353 150 - 450 K/uL    MPV 9.3 9.2 - 12.9 fL    Immature Granulocytes 0.3 0.0 - 0.5 %    Gran # (ANC) 2.8 1.8 - 7.7 K/uL    Immature Grans (Abs) 0.02 0.00 - 0.04 K/uL    Lymph # 2.5 1.0 - 4.8 K/uL    Mono # 0.4 0.3 - 1.0 K/uL    Eos # 0.1 0.0 - 0.5 K/uL    Baso # 0.04 0.00 - 0.20 K/uL    nRBC 0 0 /100 WBC    Gran % 48.5 38.0 - 73.0 %    Lymph % 42.5 18.0 - 48.0 %    Mono % 6.8 4.0 - 15.0 %    Eosinophil % 1.2 0.0 - 8.0 %    Basophil % 0.7 0.0 - 1.9 %    Differential Method Automated    EKG 12-lead   Result Value Ref Range    QRS Duration 92 ms    [facility] QTC Calculation 420 ms     Imaging Results:  Imaging Results              CT Head Without Contrast (Final result)  Result time 03/11/24 12:58:08      Final result by Charles Vallecillo MD (03/11/24 12:58:08)                   Impression:      No acute abnormality.    All CT scans at this facility use dose modulation, iterative reconstruction, and/or weight based dosing when appropriate to reduce radiation dose to as low as reasonable achievable.      Electronically signed by: Charles Vallecillo MD  Date:    03/11/2024  Time:    12:58               Narrative:    EXAMINATION:  CT HEAD WITHOUT CONTRAST    CLINICAL HISTORY:  Head trauma, moderate-severe;    TECHNIQUE:  Low dose axial CT images obtained throughout the head without intravenous contrast. Sagittal and coronal reconstructions were performed.    All CT scans at this facility use dose modulation, iterative reconstruction, and/or weight based dosing when appropriate to reduce radiation dose to as low as reasonable achievable.    COMPARISON:  None.    FINDINGS:  Intracranial  compartment:    The brain parenchyma appears normal. No parenchymal mass, hemorrhage, edema or major vascular distribution infarct.    Ventricles and sulci are normal in size for age without evidence of hydrocephalus.    No extra-axial blood or fluid collections.    Skull/extracranial contents (limited evaluation): No fracture. Mastoid air cells and paranasal sinuses are essentially clear.                                       X-Ray Chest AP Portable (Final result)  Result time 03/11/24 11:53:26      Final result by Charles Vallecillo MD (03/11/24 11:53:26)                   Impression:      No acute process seen.      Electronically signed by: Charles Vallecillo MD  Date:    03/11/2024  Time:    11:53               Narrative:    EXAMINATION:  XR CHEST AP PORTABLE    CLINICAL HISTORY:  syncope;    FINDINGS:  Single view of the chest.  No comparison    Cardiac silhouette is normal.  The lungs demonstrate no evidence of active disease.  No evidence of pleural effusion or pneumothorax.  Bones appear intact.                                     The EKG was ordered, reviewed, and independently interpreted by the ED provider.  Interpretation time: 11:36  Rate: 61 BPM  Rhythm: normal sinus rhythm  Interpretation: No acute ST changes. No STEMI.           The Emergency Provider reviewed the vital signs and test results, which are outlined above.    ED Discussion     2:05 PM: Reassessed pt at this time.  Pt states his condition has improved at this time. Discussed with pt all pertinent ED information and results. Discussed pt dx and plan of tx. Gave pt all f/u and return to the ED instructions. All questions and concerns were addressed at this time. Pt expresses understanding of information and instructions, and is comfortable with plan to discharge. Pt is stable for discharge.    I discussed with patient and/or family/caretaker that evaluation in the ED does not suggest any emergent or life threatening medical conditions requiring  immediate intervention beyond what was provided in the ED, and I believe patient is safe for discharge.  Regardless, an unremarkable evaluation in the ED does not preclude the development or presence of a serious of life threatening condition. As such, patient was instructed to return immediately for any worsening or change in current symptoms.         ED Medication(s):  Medications - No data to display     Follow-up Information       Honey Wyatt MD.    Specialty: Family Medicine  Contact information:  [address] [facility]  [address]  [facility] [address] [phone]                           New Prescriptions    HYDROXYZINE PAMOATE (VISTARIL) 25 MG CAP    Take 1 capsule (25 mg total) by mouth 4 (four) times daily.         Medical Decision Making    Medical Decision Making  Chest pressure and dizziness while at work today.    DDx: HTN, hypothyroidism    Amount and/or Complexity of Data Reviewed  Labs: ordered. Decision-making details documented in ED Course.  Radiology: ordered. Decision-making details documented in ED Course.  ECG/medicine tests: ordered and independent interpretation performed. Decision-making details documented in ED Course.    Risk  Prescription drug management.                Scribe Attestation:   Scribe #1: I performed the above scribed service and the documentation accurately describes the services I performed. I attest to the accuracy of the note.    Attending:   Physician Attestation Statement for Scribe #1: I, Darryl Alfonso MD, personally performed the services described in this documentation, as scribed by Juan Carlos Cummings, in my presence, and it is both accurate and complete.          Clinical Impression       ICD-10-CM ICD-9-CM   1. Dizziness  R42 780.4   2. Weakness  R53.1 780.79   3. Anxiety  F41.9 300.00       Disposition:   Disposition: Discharged  Condition: Stable         Darryl Alfonso MD  03/11/24 3352

## 2024-03-17 DIAGNOSIS — G43.819 OTHER MIGRAINE WITHOUT STATUS MIGRAINOSUS, INTRACTABLE: ICD-10-CM

## 2024-03-17 NOTE — TELEPHONE ENCOUNTER
No care due was identified.  [facility] Embedded Care Due Messages. Reference number: [id number].   3/17/2024 12:54:56 PM CDT

## 2024-03-18 RX ORDER — NAPROXEN 500 MG/1
500 TABLET ORAL 2 TIMES DAILY PRN
Qty: 30 TABLET | Refills: 0 | Status: SHIPPED | OUTPATIENT
Start: 2024-03-18 | End: 2024-04-02

## 2024-04-12 ENCOUNTER — PATIENT MESSAGE (OUTPATIENT)
Dept: ADMINISTRATIVE | Facility: HOSPITAL | Age: 29
End: 2024-04-12
Payer: MEDICAID

## 2024-04-12 DIAGNOSIS — I10 ESSENTIAL HYPERTENSION: ICD-10-CM

## 2024-04-12 NOTE — TELEPHONE ENCOUNTER
No care due was identified.  [facility] Embedded Care Due Messages. Reference number: [id number].   4/12/2024 1:01:45 AM CDT

## 2024-04-15 RX ORDER — AMLODIPINE AND OLMESARTAN MEDOXOMIL 5; 20 MG/1; MG/1
1 TABLET ORAL
Qty: 30 TABLET | Refills: 2 | Status: SHIPPED | OUTPATIENT
Start: 2024-04-15

## 2024-05-16 ENCOUNTER — OFFICE VISIT (OUTPATIENT)
Dept: URGENT CARE | Facility: CLINIC | Age: 29
End: 2024-05-16
Payer: MEDICAID

## 2024-05-16 VITALS
SYSTOLIC BLOOD PRESSURE: 138 MMHG | HEART RATE: 62 BPM | WEIGHT: 315 LBS | TEMPERATURE: 98 F | RESPIRATION RATE: 14 BRPM | DIASTOLIC BLOOD PRESSURE: 72 MMHG | BODY MASS INDEX: 53.78 KG/M2 | OXYGEN SATURATION: 98 % | HEIGHT: 64 IN

## 2024-05-16 DIAGNOSIS — M54.9 UPPER BACK PAIN ON RIGHT SIDE: Primary | ICD-10-CM

## 2024-05-16 PROCEDURE — 99213 OFFICE O/P EST LOW 20 MIN: CPT | Mod: S$GLB,,,

## 2024-05-16 RX ORDER — KETOROLAC TROMETHAMINE 10 MG/1
10 TABLET, FILM COATED ORAL EVERY 6 HOURS
Qty: 20 TABLET | Refills: 0 | Status: SHIPPED | OUTPATIENT
Start: 2024-05-16 | End: 2024-05-21

## 2024-05-16 RX ORDER — KETOROLAC TROMETHAMINE 30 MG/ML
30 INJECTION, SOLUTION INTRAMUSCULAR; INTRAVENOUS ONCE
Status: COMPLETED | OUTPATIENT
Start: 2024-05-16 | End: 2024-05-16

## 2024-05-16 RX ORDER — KETOROLAC TROMETHAMINE 30 MG/ML
30 INJECTION, SOLUTION INTRAMUSCULAR; INTRAVENOUS ONCE
Status: DISCONTINUED | OUTPATIENT
Start: 2024-05-16 | End: 2024-05-16

## 2024-05-16 RX ORDER — CYCLOBENZAPRINE HCL 10 MG
10 TABLET ORAL 3 TIMES DAILY PRN
Qty: 15 TABLET | Refills: 0 | Status: SHIPPED | OUTPATIENT
Start: 2024-05-16 | End: 2024-05-21

## 2024-05-16 RX ADMIN — KETOROLAC TROMETHAMINE 30 MG: 30 INJECTION, SOLUTION INTRAMUSCULAR; INTRAVENOUS at 06:05

## 2024-05-16 NOTE — PATIENT INSTRUCTIONS
You were given Toradol in clinic today. Please do not take any NSAIDs including ibuprofen, naproxen, aleve, advil, motrin, clexa, mobic, or others for the next 24 hrs. If you have abdominal pain, vomiting, or bloody stool please go to the ER immediately.     FLEXERIL Warning:   The medication you have been prescribed may cause drowsiness and impair your judgement.  Therefore, you should avoid driving, climbing, using machinery, etc., so as not to increase your risk of injury.  Do NOT drink any alcohol while on this medication(s).

## 2024-05-16 NOTE — PROGRESS NOTES
"Subjective:      Patient ID: Augusto Rutledge is a 28 y.o. male.    Vitals:  height is 5' 4.29" (1.633 m) and weight is 146.5 kg (322 lb 13.8 oz) (abnormal). His tympanic temperature is 98.3 °F (36.8 °C). His blood pressure is 138/72 and his pulse is 62. His respiration is 14 and oxygen saturation is 98%.     Chief Complaint: Back Pain    Patient presents today with upper back pain. Pain in on the  right side. 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 Patient states it hurts to the tough and is swollen. Patient states he has not hit ii or injure it  on anything.     Provider note below   28-year-old male here for evaluation  of right upper back/shoulder pain x1 week.  No  injury or trauma.   Patient states he lifts heavy objects at work which seems to exacerbate his symptoms.  Patient states affected area hurts to the touch.  He has taken a few doses of Aleve with minimal improvement.  No radicular pain, extremity paresthesia.  No other acute complaints    Back Pain  This is a new problem. The current episode started in the past 7 days. The problem occurs constantly. The pain is at a severity of 9/10. The pain is severe. The pain is The same all the time. The symptoms are aggravated by bending and twisting. Pertinent negatives include no abdominal pain, bladder incontinence, bowel incontinence, chest pain, dysuria, fever, headaches, leg pain, numbness, paresis, paresthesias, pelvic pain, perianal numbness, tingling, weakness or weight loss. Treatments tried: advil. The treatment provided no relief.       Constitution: Negative for fever.   Cardiovascular:  Negative for chest pain.   Gastrointestinal:  Negative for abdominal pain and bowel incontinence.   Genitourinary:  Negative for dysuria, bladder incontinence and pelvic pain.   Musculoskeletal:  Positive for back pain.   Neurological:  Negative for headaches and numbness.      Objective:     Physical Exam   Constitutional: He is " oriented to person, place, and time. He appears well-developed. He is cooperative. No distress.   HENT:   Head: Normocephalic and atraumatic.   Nose: Nose normal.   Mouth/Throat: Oropharynx is clear and moist and mucous membranes are normal.   Eyes: Conjunctivae and lids are normal.   Neck: Trachea normal and phonation normal. Neck supple.   Cardiovascular: Normal rate, regular rhythm, normal heart sounds and normal pulses.   Pulmonary/Chest: Effort normal and breath sounds normal.   Abdominal: Normal appearance and bowel sounds are normal. He exhibits no mass. Soft.   Musculoskeletal:         General: No deformity.      Comments:  Posterior aspect of right  shoulder tender to palpation.  Strength equal 5/5 bilateral upper extremities.  2+ radial pulses bilaterally.  Range of motion intact.   Neurological: He is alert and oriented to person, place, and time. He has normal strength and normal reflexes. No sensory deficit.   Skin: Skin is warm, dry, intact and not diaphoretic.   Psychiatric: His speech is normal and behavior is normal. Judgment and thought content normal.   Nursing note and vitals reviewed.      Assessment:     1. Upper back pain on right side        Plan:       Patient presents with upper back and shoulder pain most consistent with musculoskeletal strain.  No trauma, low suspicion for fracture.  No localized bony tenderness.  Physical exam findings as documented.  Discussed diagnosis and recommended initial conservative management with patient and his mother at bedside.  Patient given Toradol IM in clinic.  Will DC home with prescriptions for Toradol and Flexeril.  Proper use and side effects discussed.  He will need to follow up with PCP in 1 week or sooner as needed.  ED and return to clinic precautions discussed.  Patient is safe for discharge    Upper back pain on right side  -     Discontinue: ketorolac injection 30 mg  -     ketorolac injection 30 mg  -     ketorolac (TORADOL) 10 mg tablet;  Take 1 tablet (10 mg total) by mouth every 6 (six) hours. for 5 days  Dispense: 20 tablet; Refill: 0  -     cyclobenzaprine (FLEXERIL) 10 MG tablet; Take 1 tablet (10 mg total) by mouth 3 (three) times daily as needed for Muscle spasms.  Dispense: 15 tablet; Refill: 0

## 2024-05-16 NOTE — LETTER
May 16, 2024      [facility]  [address]  [facility] [address]  Phone: [phone]       Patient: Augusto Rutledge   YOB: 1995  Date of Visit: 05/16/2024    To Whom It May Concern:    Hermes Rutledge  was at [facility] on 05/16/2024. The patient may return to work/school on 05/20/24 with no restrictions. If you have any questions or concerns, or if I can be of further assistance, please do not hesitate to contact me.    Sincerely,          Trinidad Ac PA-C

## 2024-06-12 ENCOUNTER — OFFICE VISIT (OUTPATIENT)
Dept: URGENT CARE | Facility: CLINIC | Age: 29
End: 2024-06-12
Payer: MEDICAID

## 2024-06-12 VITALS
OXYGEN SATURATION: 96 % | TEMPERATURE: 98 F | SYSTOLIC BLOOD PRESSURE: 138 MMHG | RESPIRATION RATE: 17 BRPM | BODY MASS INDEX: 53.78 KG/M2 | DIASTOLIC BLOOD PRESSURE: 62 MMHG | WEIGHT: 315 LBS | HEART RATE: 54 BPM | HEIGHT: 64 IN

## 2024-06-12 DIAGNOSIS — J06.9 VIRAL URI: Primary | ICD-10-CM

## 2024-06-12 DIAGNOSIS — R05.9 COUGH, UNSPECIFIED TYPE: ICD-10-CM

## 2024-06-12 DIAGNOSIS — R52 GENERALIZED BODY ACHES: ICD-10-CM

## 2024-06-12 LAB
CTP QC/QA: YES
SARS-COV-2 AG RESP QL IA.RAPID: NEGATIVE

## 2024-06-12 PROCEDURE — 87811 SARS-COV-2 COVID19 W/OPTIC: CPT | Mod: QW,S$GLB,, | Performed by: PHYSICIAN ASSISTANT

## 2024-06-12 PROCEDURE — 99213 OFFICE O/P EST LOW 20 MIN: CPT | Mod: S$GLB,,, | Performed by: PHYSICIAN ASSISTANT

## 2024-06-12 RX ORDER — BENZONATATE 100 MG/1
100 CAPSULE ORAL 3 TIMES DAILY PRN
Qty: 30 CAPSULE | Refills: 0 | Status: SHIPPED | OUTPATIENT
Start: 2024-06-12

## 2024-06-12 NOTE — PROGRESS NOTES
"Subjective:      Patient ID: Augusto Rutledge is a 28 y.o. male.    Vitals:  height is 5' 4" (1.626 m) and weight is 143.1 kg (315 lb 7.7 oz) (abnormal). His tympanic temperature is 97.6 °F (36.4 °C). His blood pressure is 138/62 and his pulse is 54 (abnormal). His respiration is 17 and oxygen saturation is 96%.     Chief Complaint: Cough    Pt presents to the clinic today with a dry cough, and body aches that all began about 4 days ago. Denies fever/chills, n/v/d, sob, cp, congestion, sore throat. Works at [employer] and admits to co-workers having similar symptoms. Accompanied today with his mom.     Cough  This is a new problem. The current episode started in the past 7 days. The problem has been unchanged. The problem occurs every few minutes. The cough is Non-productive. Associated symptoms include myalgias. Pertinent negatives include no chest pain, chills, ear congestion, ear pain, fever, headaches, heartburn, hemoptysis, nasal congestion, postnasal drip, rash, rhinorrhea, sore throat, shortness of breath, sweats, weight loss or wheezing. Nothing aggravates the symptoms. Treatments tried: Cloricidin. The treatment provided mild relief. There is no history of asthma, bronchiectasis, bronchitis, COPD, emphysema, environmental allergies or pneumonia.       Constitution: Negative for chills, sweating, fatigue and fever.   HENT:  Negative for ear pain, congestion, postnasal drip and sore throat.    Cardiovascular:  Negative for chest pain, leg swelling and sob on exertion.   Respiratory:  Positive for cough. Negative for sputum production, bloody sputum, shortness of breath, wheezing and asthma.    Gastrointestinal:  Negative for abdominal pain, nausea, vomiting, diarrhea and heartburn.   Musculoskeletal:  Positive for muscle ache.   Skin:  Negative for rash.   Allergic/Immunologic: Negative for environmental allergies and asthma.   Neurological:  Negative for dizziness and headaches.      Objective:     Physical Exam "   Constitutional: He appears well-developed.  Non-toxic appearance. He does not appear ill. No distress. obesity  HENT:   Head: Normocephalic and atraumatic.   Ears:   Right Ear: Tympanic membrane, external ear and ear canal normal.   Left Ear: Tympanic membrane, external ear and ear canal normal.   Nose: Nose normal.   Mouth/Throat: Uvula is midline, oropharynx is clear and moist and mucous membranes are normal. Mucous membranes are moist. Oropharynx is clear.   Eyes: Conjunctivae and EOM are normal.   Neck: Neck supple.   Cardiovascular: Normal rate and regular rhythm.   Pulmonary/Chest: Effort normal and breath sounds normal.   Abdominal: Normal appearance.   Musculoskeletal: Normal range of motion.         General: Normal range of motion.   Lymphadenopathy:     He has no cervical adenopathy.   Neurological: no focal deficit. He is alert. He displays no weakness. Gait normal.   Skin: Skin is warm, dry, not diaphoretic, not pale and no rash.   Psychiatric: His behavior is normal.     Results for orders placed or performed in visit on 06/12/24   SARS Coronavirus 2 Antigen, POCT Manual Read   Result Value Ref Range    SARS Coronavirus 2 Antigen Negative Negative     Acceptable Yes          Assessment:     1. Viral URI    2. Cough, unspecified type    3. Generalized body aches        Plan:     Likely viral in nature. Lungs CTA. No fever. Low concern for flu. Tessalon perles vs Coricidin prn for cough. Close f/u if symptoms worsen or fail to improve over the next weeks.     Viral URI    Cough, unspecified type  -     SARS Coronavirus 2 Antigen, POCT Manual Read  -     benzonatate (TESSALON PERLES) 100 MG capsule; Take 1 capsule (100 mg total) by mouth 3 (three) times daily as needed for Cough.  Dispense: 30 capsule; Refill: 0    Generalized body aches  -     SARS Coronavirus 2 Antigen, POCT Manual Read

## 2024-06-12 NOTE — LETTER
June 12, 2024      [facility]  [address]  [facility] [address]  Phone: [phone]       Patient: Augusto Rutledge   YOB: 1995  Date of Visit: 06/12/2024    To Whom It May Concern:    Hermes Rutledge  was at [facility] on 06/12/2024. The patient may return to work/school on 06/13/24 with no restrictions. If you have any questions or concerns, or if I can be of further assistance, please do not hesitate to contact me.    Sincerely,    Sherrill Bardales PA-C

## 2024-06-26 ENCOUNTER — TELEPHONE (OUTPATIENT)
Dept: PRIMARY CARE CLINIC | Facility: CLINIC | Age: 29
End: 2024-06-26
Payer: MEDICAID

## 2024-06-26 NOTE — TELEPHONE ENCOUNTER
----- Message from Amalia Arizmendi sent at 6/26/2024 12:40 PM CDT -----  Contact: [employer]omen[employer]     [employer] is calling for the status on 90L Form for Patient.  The 2nd time faxed over on June 18th. Please call to advise

## 2024-06-26 NOTE — TELEPHONE ENCOUNTER
----- Message from Louise Otero sent at 6/26/2024  2:46 PM CDT -----  Contact: Pt [phone]  Type: Returning a call    Who left a message? Lalo Mcnamara MA    When did the practice call? Today    Does patient know what this is regarding: Yes    Would the patient rather a call back or a response via [facility]? Call Back    Comments: If no one answer, mother ask to send a message to her through the portal.   (Adrienne Alejandro,[phone] )      Please Call and advise    Thank you    Please do NOT rep[ly to sender as this is from the call center and they answer incoming calls only.

## 2024-07-09 ENCOUNTER — PATIENT MESSAGE (OUTPATIENT)
Dept: ADMINISTRATIVE | Facility: HOSPITAL | Age: 29
End: 2024-07-09
Payer: MEDICAID

## 2024-07-25 ENCOUNTER — OFFICE VISIT (OUTPATIENT)
Dept: PRIMARY CARE CLINIC | Facility: CLINIC | Age: 29
End: 2024-07-25
Payer: MEDICAID

## 2024-07-25 DIAGNOSIS — Z02.9 ENCOUNTERS FOR ADMINISTRATIVE PURPOSE: ICD-10-CM

## 2024-07-25 DIAGNOSIS — R62.50 DEVELOPMENTAL DELAY: Primary | Chronic | ICD-10-CM

## 2024-07-25 DIAGNOSIS — G43.819 OTHER MIGRAINE WITHOUT STATUS MIGRAINOSUS, INTRACTABLE: ICD-10-CM

## 2024-07-25 RX ORDER — NAPROXEN 500 MG/1
500 TABLET ORAL 2 TIMES DAILY PRN
Qty: 60 TABLET | Refills: 2 | Status: SHIPPED | OUTPATIENT
Start: 2024-07-25

## 2024-07-25 NOTE — PROGRESS NOTES
The patient location is: [place] at home  Visit type: Virtual visit with synchronous audio and video  Total time spent with patient:30 mins  This includes face to face time and non-face to face time preparing to see the patient (eg, review of tests), obtaining and/or reviewing separately obtained history, documenting clinical information in the electronic or other health record, independently interpreting results and communicating results to the patient/family/caregiver, or care coordinator.   Each patient to whom he or she provides medical services by telemedicine is:  (1) informed of the relationship between the physician and patient and the respective role of any other health care provider with respect to management of the patient; and (2) notified that he or she may decline to receive medical services by telemedicine and may withdraw from such care at any time.    Subjective:       Patient ID: Augusto Rutledge is a 28 y.o. male.    Chief Complaint: Follow-up (Needs refill )      History of Present Illness:   Augusto Rutledge 28 y.o. male presents today with Follow-up (Needs refill )    Augusto Terrys allergies, medications, history, and problem list were updated as appropriate.   Past Medical History:   Diagnosis Date    ADHD (attention deficit hyperactivity disorder)     Anxiety     Depression     Heart murmur     Hypertension     Thyroid disease      Family History   Problem Relation Name Age of Onset    Depression Mother      Arthritis Mother      Hypertension Mother       Social History     Socioeconomic History    Marital status: Single   Tobacco Use    Smoking status: Never     Passive exposure: Never    Smokeless tobacco: Never   Substance and Sexual Activity    Alcohol use: Yes     Comment: occ    Drug use: Never    Sexual activity: Not Currently     Social Determinants of Health     Financial Resource Strain: Medium Risk (7/25/2024)    Overall Financial Resource Strain (CARDIA)     Difficulty of Paying Living  Expenses: Somewhat hard   Food Insecurity: No Food Insecurity (7/25/2024)    Hunger Vital Sign     Worried About Running Out of Food in the Last Year: Never true     Ran Out of Food in the Last Year: Never true   Physical Activity: Sufficiently Active (7/25/2024)    Exercise Vital Sign     Days of Exercise per Week: 5 days     Minutes of Exercise per Session: 150+ min   Stress: No Stress Concern Present (7/25/2024)    [employer] - Occupational Stress Questionnaire     Feeling of Stress : Only a little   Housing Stability: Unknown (7/25/2024)    Housing Stability Vital Sign     Unable to Pay for Housing in the Last Year: No     Outpatient Encounter Medications as of 7/25/2024   Medication Sig Dispense Refill    amlodipine-olmesartan (CHERIE) 5-20 mg per tablet TAKE 1 TABLET BY MOUTH EVERY DAY 30 tablet 2    benzonatate (TESSALON PERLES) 100 MG capsule Take 1 capsule (100 mg total) by mouth 3 (three) times daily as needed for Cough. 30 capsule 0    hydrOXYzine pamoate (VISTARIL) 25 MG Cap Take 1 capsule (25 mg total) by mouth 4 (four) times daily. (Patient not taking: Reported on 5/16/2024) 30 capsule 0    levothyroxine (SYNTHROID) 75 MCG tablet Take 1 tablet (75 mcg total) by mouth once daily. 30 tablet 2    naproxen (NAPROSYN) 500 MG tablet Take 1 tablet (500 mg total) by mouth 2 (two) times daily as needed (migraine). 60 tablet 2    ondansetron (ZOFRAN-ODT) 4 MG TbDL Take 1 tablet (4 mg total) by mouth every 6 (six) hours as needed (nausea/vomiting). (Patient not taking: Reported on 5/16/2024) 12 tablet 0     No facility-administered encounter medications on file as of 7/25/2024.       Review of Systems   Constitutional:  Negative for activity change.   HENT:  Negative for hearing loss and trouble swallowing.    Eyes:  Negative for discharge.   Respiratory:  Negative for chest tightness and wheezing.    Cardiovascular:  Negative for chest pain and palpitations.   Gastrointestinal:   Negative for constipation, diarrhea and vomiting.   Genitourinary:  Negative for difficulty urinating and hematuria.   Neurological:  Positive for headaches.   Psychiatric/Behavioral:  Negative for dysphoric mood.      Objective:      There were no vitals taken for this visit.  Physical Exam    CONSTITUTIONAL: No apparent distress. Appears comfortable. Does not appear acutely ill or septic. Appears adequately hydrated.  CARDIOVASCULAR: No perioral cyanosis  PULMONARY: Breathing unlabored. No retractions Chest expansion grossly normal.  PSYCHIATRIC: Alert and Mood is grossly neutral.   NEUROLOGIC: No focal sensory deficits reported.   Results for orders placed or performed in visit on 06/12/24   SARS Coronavirus 2 Antigen, POCT Manual Read   Result Value Ref Range    SARS Coronavirus 2 Antigen Negative Negative     Acceptable Yes      Assessment:       1. Developmental delay    2. Other migraine without status migrainosus, intractable    3. Encounters for administrative purpose        Plan:   1. Developmental delay  Comments:  Dependent on 1-2 ADL, mother is his primary giver, he will be able to live in an apt with asistance. He does not wonder and has a job.  Overview:  Diagnosed as a infant.  Mother is primary caregive.  Dependent in 1-2 ADLs. Mother has always been with him, taking care of him and assisting him to communicate his needs.  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  Needs to be in his own apt but he would need assistance daily for ADL and medication management.        2. Other migraine without status migrainosus, intractable  Overview:  Diagnosed as a teenager.  Asso with photophobia and phonophobia, triggered by stress and nothing helps except laying down.  Symptomatic therapy-dim lights and cool washcloth.  Naproxen relieves sxs, asking for refill.      Orders:  -     naproxen (NAPROSYN) 500 MG tablet; Take 1 tablet (500 mg total) by mouth 2 (two) times daily as needed  (migraine).  Dispense: 60 tablet; Refill: 2    3. Encounters for administrative purpose  Comments:  see scanned document        I have reviewed all of the patient's clinical history available in care everywhere and Epic and have utilized this in my evaluation and management recommendations today.      Treatment options and alternatives were discussed with the patient. Patient was given ample time to ask questions. All questions were answered. Voices understanding and acceptance of this advice. Will call back if any further questions or concerns.       Portions of the record may have been created with voice recognition software. Occasional wrong-word or sound-a-like substitutions may have occurred due to the inherent limitations of voice recognition software. Read the chart carefully and recognize, using context, where substitutions have occurred.               Honey Wyatt MD  [facility],

## 2024-10-29 ENCOUNTER — OFFICE VISIT (OUTPATIENT)
Dept: URGENT CARE | Facility: CLINIC | Age: 29
End: 2024-10-29
Payer: MEDICAID

## 2024-10-29 ENCOUNTER — HOSPITAL ENCOUNTER (OUTPATIENT)
Dept: RADIOLOGY | Facility: CLINIC | Age: 29
Discharge: HOME | End: 2024-10-29
Attending: NURSE PRACTITIONER
Payer: MEDICAID

## 2024-10-29 VITALS
DIASTOLIC BLOOD PRESSURE: 90 MMHG | HEART RATE: 61 BPM | HEIGHT: 63 IN | SYSTOLIC BLOOD PRESSURE: 179 MMHG | TEMPERATURE: 98 F | OXYGEN SATURATION: 100 % | BODY MASS INDEX: 55.81 KG/M2 | WEIGHT: 315 LBS | RESPIRATION RATE: 20 BRPM

## 2024-10-29 DIAGNOSIS — M79.641 PAIN OF RIGHT HAND: ICD-10-CM

## 2024-10-29 DIAGNOSIS — S64.01XA INJURY OF RIGHT ULNAR NERVE AT HAND LEVEL, INITIAL ENCOUNTER: Primary | ICD-10-CM

## 2024-10-29 DIAGNOSIS — M79.644 PAIN OF RIGHT MIDDLE FINGER: ICD-10-CM

## 2024-10-29 DIAGNOSIS — R62.50 DEVELOPMENTAL DELAY: ICD-10-CM

## 2024-10-29 PROCEDURE — 73130 X-RAY EXAM OF HAND: CPT | Mod: RT,S$GLB,, | Performed by: RADIOLOGY

## 2024-10-29 PROCEDURE — 99213 OFFICE O/P EST LOW 20 MIN: CPT | Mod: S$GLB,,, | Performed by: NURSE PRACTITIONER

## 2024-10-31 ENCOUNTER — TELEPHONE (OUTPATIENT)
Dept: PRIMARY CARE CLINIC | Facility: CLINIC | Age: 29
End: 2024-10-31
Payer: MEDICAID

## 2024-12-19 ENCOUNTER — OFFICE VISIT (OUTPATIENT)
Dept: PRIMARY CARE CLINIC | Facility: CLINIC | Age: 29
End: 2024-12-19
Payer: MEDICAID

## 2024-12-19 ENCOUNTER — HOSPITAL ENCOUNTER (OUTPATIENT)
Dept: RADIOLOGY | Facility: HOSPITAL | Age: 29
Discharge: HOME | End: 2024-12-19
Attending: NURSE PRACTITIONER
Payer: MEDICAID

## 2024-12-19 VITALS
BODY MASS INDEX: 55.81 KG/M2 | WEIGHT: 315 LBS | DIASTOLIC BLOOD PRESSURE: 82 MMHG | HEART RATE: 68 BPM | HEIGHT: 63 IN | TEMPERATURE: 98 F | SYSTOLIC BLOOD PRESSURE: 138 MMHG | OXYGEN SATURATION: 99 %

## 2024-12-19 DIAGNOSIS — M79.641 RIGHT HAND PAIN: Primary | ICD-10-CM

## 2024-12-19 DIAGNOSIS — G43.819 OTHER MIGRAINE WITHOUT STATUS MIGRAINOSUS, INTRACTABLE: ICD-10-CM

## 2024-12-19 DIAGNOSIS — I10 ESSENTIAL HYPERTENSION: ICD-10-CM

## 2024-12-19 PROCEDURE — 99215 OFFICE O/P EST HI 40 MIN: CPT | Mod: PBBFAC,25,PN | Performed by: NURSE PRACTITIONER

## 2024-12-19 PROCEDURE — 73130 X-RAY EXAM OF HAND: CPT | Mod: TC,PN,RT

## 2024-12-19 PROCEDURE — 73130 X-RAY EXAM OF HAND: CPT | Mod: 26,RT,, | Performed by: RADIOLOGY

## 2024-12-19 PROCEDURE — 99999 PR PBB SHADOW E&M-EST. PATIENT-LVL V: CPT | Mod: PBBFAC,,, | Performed by: NURSE PRACTITIONER

## 2024-12-19 RX ORDER — AMLODIPINE AND OLMESARTAN MEDOXOMIL 5; 20 MG/1; MG/1
1 TABLET ORAL DAILY
Qty: 30 TABLET | Refills: 2 | Status: SHIPPED | OUTPATIENT
Start: 2024-12-19

## 2024-12-19 RX ORDER — NAPROXEN 500 MG/1
500 TABLET ORAL 2 TIMES DAILY PRN
Qty: 60 TABLET | Refills: 2 | Status: SHIPPED | OUTPATIENT
Start: 2024-12-19

## 2024-12-19 NOTE — PROGRESS NOTES
Subjective:       Patient ID: Augusto Rutledge is a 29 y.o. male.    Chief Complaint: Hand Injury (Hand pain x 3 months )        Augusto Rutledge 29 y.o. male   History of Present Illness    CHIEF COMPLAINT:  - Augusto presents with pain and swelling in the hand, which has been ongoing for approximately three months following an incident at work.    HPI:  - Augusto reports pain and swelling in his hand, which began 3 months ago following a work incident. The exact cause is uncertain. Initially, the patient applied ice to the affected area, providing some relief. The pain persisted and worsened when lifting objects at work a few days later.  - The pain is localized to a specific part of the hand. The affected area becomes swollen, and mother suggested it might be nerve pain. Augusto has difficulty grasping objects but reports no numbness or tingling in the fingers or on the back of the hand.  - Augusto visited an urgent care facility where an x-ray was performed. Providers reportedly diagnosed a nerve issue but did not mention fractures. No physical therapy has been undertaken for this condition.  - To manage the pain, the patient has been using his migraine medication, which he reports helps alleviate the hand pain. The pain worsens when lifting objects or applying pressure to the affected area. Augusto rates the pain as particularly severe during these activities.  - Augusto retains some  strength but has pain when gripping or applying pressure. No specific treatment has been received for this hand condition beyond the initial icing and use of migraine medication.    MEDICATIONS:  - Blood pressure medication  - Migraine medication  - Escitalopram  - Hydroxyzine    MEDICAL HISTORY:  - Hypertension  - Migraines    PERTINENT FAMILY AND SURGERY HISTORY:  - Brother: Hypertension, diagnosed at age 32  - Brother: Blood clot in leg    IMAGING:  - Hand X-ray: October, results not specified, done at urgent care    ALLERGIES:  - Naproxen:  allergic reaction    SOCIAL HISTORY:  - Occupation: Previously worked at [employer], currently works from home      ROS:  General: -fever, -chills, -fatigue, -weight gain, -weight loss  Eyes: -vision changes, -redness, -discharge  ENT: -ear pain, -nasal congestion, -sore throat  Cardiovascular: -chest pain, -palpitations, -lower extremity edema  Respiratory: -cough, -shortness of breath  Gastrointestinal: -abdominal pain, -nausea, -vomiting, -diarrhea, -constipation, -blood in stool  Genitourinary: -dysuria, -hematuria, -frequency  Musculoskeletal: +joint pain, -muscle pain  Skin: -rash, -lesion  Neurological: +headache, -dizziness, -numbness, -tingling  Psychiatric: -anxiety, -depression, -sleep difficulty  Head: +head pain  Allergic: +allergic reactions        Past Medical History:   Diagnosis Date    ADHD (attention deficit hyperactivity disorder)     Anxiety     Depression     Heart murmur     Hypertension     Migraines     Thyroid disease      Family History   Problem Relation Name Age of Onset    Depression Mother      Arthritis Mother      Hypertension Mother       Social History     Socioeconomic History    Marital status: Single   Tobacco Use    Smoking status: Never     Passive exposure: Never    Smokeless tobacco: Never   Substance and Sexual Activity    Alcohol use: Yes     Comment: occ    Drug use: Never    Sexual activity: Not Currently     Social Drivers of Health     Financial Resource Strain: Medium Risk (7/25/2024)    Overall Financial Resource Strain (CARDIA)     Difficulty of Paying Living Expenses: Somewhat hard   Food Insecurity: No Food Insecurity (7/25/2024)    Hunger Vital Sign     Worried About Running Out of Food in the Last Year: Never true     Ran Out of Food in the Last Year: Never true   Physical Activity: Sufficiently Active (7/25/2024)    Exercise Vital Sign     Days of Exercise per Week: 5 days     Minutes of Exercise per Session: 150+ min   Stress: No Stress Concern Present (7/25/2024)  "   [employer] - Occupational Stress Questionnaire     Feeling of Stress : Only a little   Housing Stability: Unknown (7/25/2024)    Housing Stability Vital Sign     Unable to Pay for Housing in the Last Year: No     Outpatient Encounter Medications as of 12/19/2024   Medication Sig Dispense Refill    hydrOXYzine pamoate (VISTARIL) 25 MG Cap Take 1 capsule (25 mg total) by mouth 4 (four) times daily. 30 capsule 0    levothyroxine (SYNTHROID) 75 MCG tablet Take 1 tablet (75 mcg total) by mouth once daily. 30 tablet 2    [DISCONTINUED] amlodipine-olmesartan (CHERIE) 5-20 mg per tablet TAKE 1 TABLET BY MOUTH EVERY DAY 30 tablet 2    [DISCONTINUED] naproxen (NAPROSYN) 500 MG tablet Take 1 tablet (500 mg total) by mouth 2 (two) times daily as needed (migraine). 60 tablet 2    amlodipine-olmesartan (CHERIE) 5-20 mg per tablet Take 1 tablet by mouth once daily. 30 tablet 2    naproxen (NAPROSYN) 500 MG tablet Take 1 tablet (500 mg total) by mouth 2 (two) times daily as needed (migraine). 60 tablet 2    [DISCONTINUED] benzonatate (TESSALON PERLES) 100 MG capsule Take 1 capsule (100 mg total) by mouth 3 (three) times daily as needed for Cough. (Patient not taking: Reported on 10/29/2024) 30 capsule 0    [DISCONTINUED] ondansetron (ZOFRAN-ODT) 4 MG TbDL Take 1 tablet (4 mg total) by mouth every 6 (six) hours as needed (nausea/vomiting). 12 tablet 0     No facility-administered encounter medications on file as of 12/19/2024.           Objective:      /82   Pulse 68   Temp 97.7 °F (36.5 °C) (Oral)   Ht 5' 3" (1.6 m)   Wt (!) 146.2 kg (322 lb 6.4 oz)   SpO2 99%   BMI 57.11 kg/m²   Physical Exam    General: In no acute distress.  Head: Normocephalic. Non traumatic.  Eyes: PERRLA. EOMs full. Conjunctivae clear. Fundi grossly normal.  Ears: EACs clear. TMs normal.  Nose: Mucosa pink. Mucosa moist. No obstruction.  Throat: Clear. No exudates. No lesions.  Neck: Supple. No masses. No thyromegaly. " No bruits.  Chest: Lungs clear. No rales. No rhonchi. No wheezes.  Heart: RRR. No murmurs. No rubs. No gallops.  Abdomen: Soft. No tenderness. No masses. BS normal.  : Normal external genitalia. No lesions. No discharge. No hernias  noted.  Back: Normal curvature. No scoliosis. No tenderness.  Extremities: Warm. Well perfused. No upper extremity edema. No lower extremity edema. FROM. No deformities. No joint erythema.  Neuro: No focal deficits appreciated. Good muscle tone. Normal response to visual stimuli. Normal response to auditory stimuli.  Skin: Normal. No rashes. No lesions noted.  Musculoskeletal: PAIN ON . PAIN ON PALPATION.  Vitals: BLOOD PRESSURE: 179/90.            Results for orders placed or performed in visit on 06/12/24   SARS Coronavirus 2 Antigen, POCT Manual Read    Collection Time: 06/12/24  9:26 AM   Result Value Ref Range    SARS Coronavirus 2 Antigen Negative Negative     Acceptable Yes      Assessment & Plan    HAND INJURY AND POTENTIAL NERVE DAMAGE:  - Evaluated patient's hand injury, persisting for 3 months.  - Considered possibility of nerve damage due to ongoing pain and limited function.  - Assessed  strength and pain response during physical exam.  - Planned for physical therapy as next step if x-ray shows no significant findings.  - Considered MRI as potential future diagnostic tool if symptoms persist.  - Started ibuprofen 800 mg for hand pain.  - X-ray of affected hand ordered to reassess injury status and guide further management.  - Referred to Physical therapy for hand injury, pending x-ray results.    HYPERTENSION:  - Explained importance of consistent blood pressure medication adherence.  - Discussed potential risks of untreated hypertension, referencing patient's brother's experience with blood clot.  - Augusto to take blood pressure medication as prescribed.          ICD-10-CM ICD-9-CM   1. Right hand pain  M79.641 729.5   2. Essential hypertension  I10  401.9   3. Other migraine without status migrainosus, intractable  G43.819 346.81        This note was generated with the assistance of ambient listening technology. Verbal consent was obtained by the patient and accompanying visitor(s) for the recording of patient appointment to facilitate this note. I attest to having reviewed and edited the generated note for accuracy, though some syntax or spelling errors may persist. Please contact the author of this note for any clarification.        [facility] [address]  [place] [address]  Office [phone]  Fax [phone]

## 2025-01-14 ENCOUNTER — CLINICAL SUPPORT (OUTPATIENT)
Dept: REHABILITATION | Facility: HOSPITAL | Age: 30
End: 2025-01-14
Payer: MEDICAID

## 2025-01-14 DIAGNOSIS — R29.898 DECREASED GRIP STRENGTH OF RIGHT HAND: ICD-10-CM

## 2025-01-14 DIAGNOSIS — M79.641 HAND PAIN, RIGHT: Primary | ICD-10-CM

## 2025-01-14 DIAGNOSIS — M79.641 RIGHT HAND PAIN: ICD-10-CM

## 2025-01-14 DIAGNOSIS — Z78.9 DECREASED ACTIVITIES OF DAILY LIVING (ADL): ICD-10-CM

## 2025-01-14 PROCEDURE — 97165 OT EVAL LOW COMPLEX 30 MIN: CPT

## 2025-01-14 PROCEDURE — 97530 THERAPEUTIC ACTIVITIES: CPT

## 2025-01-14 NOTE — PROGRESS NOTES
[facility]  Occupational Therapy Initial Evaluation     Date: 1/14/2025  Name: Augusto Rutledge  Clinic Number: [id number]    Therapy Diagnosis:   Encounter Diagnoses   Name Primary?    Right hand pain     Hand pain, right Yes    Decreased  strength of right hand     Decreased activities of daily living (ADL)      Physician: Yaakov Woodard NP    Physician Orders: OT eval and tx  Medical Diagnosis: Right hand pain [M79.641]     Evaluation Date: 1/14/2025  Insurance Authorization Period Expiration: 12/19/2024 to 12/19/2025  Plan of Care Certification Period: 1/14/2025 to 2/25/2025  Progress Note Due: 2/11/2025     Visit # / Visits authorized: 1/1  FOTO: 1/3     Date of Return to MD: PRN    Precautions:  Standard    Time In: 4:45  Time Out: 5:30  Total Appointment Time (timed & untimed codes): 45 minutes    Subjective     Involved Side: right  Dominant Side: Right    Date of Onset: 2 months ago  Mechanism of Injury/ History of Current Condition: problems with right hand with swelling for  the past couple of weeks.  Hit hand at work, but not sure exactly how.  Started having problems after that.  Using my migraine medication to help with the pain and I haven't taken anything else.    Imaging: No acute osseous or soft tissue abnormality.     Previous Therapy: none    Patient's Goals for Therapy: be able to use hand and lift things without hurting    Pain:  Functional Pain Scale Rating 0-10:   6/10 on average  6/10 at best  6/10 at worst  Location: palm of right hand at base of middle finger  Description: Sharp  Aggravating Factors: Lifting  Easing Factors: pain medication    Functional Limitations/Social History:    Previous Functional Status: Independent with all ADL/IADL tasks     Current Functional Status: unable to lift anything over 20#; pain when using     Home/Living environment: lives with their family     Driving: doesn't drive    Leisure: playstation games, but can't do  currently    Occupation: [employer]  Working presently: unemployed  Duties: cleaning duties        Past Medical History/Physical Systems Review:   Medical History:   Past Medical History:   Diagnosis Date    ADHD (attention deficit hyperactivity disorder)     Anxiety     Depression     Heart murmur     Hypertension     Migraines     Thyroid disease        Surgical History:    has a past surgical history that includes Eye surgery.    Medications:   has a current medication list which includes the following prescription(s): amlodipine-olmesartan, hydroxyzine pamoate, levothyroxine, and naproxen.    Allergies:   Review of patient's allergies indicates:  No Known Allergies       Objective     Observation/Inspection: Skin intact and Hypersensitive/painful to palpation at volar right MF metacarpal head.  No evidence of trigger finger response.    Sensation: Pt denies paresthesias. Intact to light touch. Hypersensitivity reported.     Edema: Circumferential measurements (in centimeters)   Right Left    1/14/2025 1/14/2025   MCPs 20.0 19.8   Long Proximal Phalanx 6.3 6.3             right Hand Active Range of Motion:   (Ext/Flex) Left Long Right Long    1/14/2025 1/14/2025   MCP Jt 0/80 0/86°   PIP Jt 0/104 0/93°   DIP Jt 0/70 0/75°   FRANK 254 254                                                           and Pinch Strength (in pounds, psi's):   Right Left    1/14/2025 1/14/2025    II 41* 63   Lateral 17* 21   Tripod 15* 17   *w/ pain localized over volar middle finger metacarpal      Intake Outcome Measure for FOTO Hand Survey    Therapist reviewed FOTO scores for Augusto Rutledge on 1/14/2025.   FOTO documents entered into EPIC - see Media section.    Intake Score: 48%     Predicted Functional Score: 64%     Treatment     Total Treatment time (time-based codes) separate from Evaluation: 35 minutes    Augusto received the treatments listed below:      Manual therapy techniques: Myofacial release and soft tissue mobilization  "for 15 minutes including:  -cupping and manual mobilization over MF metacarpal/A1 pulley  - application of "I" tape x2 w/ cross pattern over point of pain; instructed pt and his aunt in wear, care and precautions of use      therapeutic exercises to develop ROM for 10 minutes including:  - ROM, girth, /pinch strength, FOTO w/ review of status and discussion of plan w/ pt and his aunt    self-care techniques to improve independence and safety with ADL/IADL tasks for 0 minutes including:  -     neuromuscular re-education activities to improve Coordination and Proprioception for 0 minutes including:  -     therapeutic activities to improve functional performance for 0  minutes including:  -     Direct contact modalities after being cleared for contraindications:   -Ultrasound to right palm/volar MF, 3.0 MHz, .8 w/cm2, 50% duty cycle for 8 minutes to decrease pain/inflammation, increase circulation, and improve tissue extensibility    Home Exercise Program/Education:    Education provided:   - Role of OT, goals for OT, scheduling/cancellations, therapy attendance policy    Written Home Exercises Provided: Yes  Exercises were reviewed and Augusto was able to demonstrate them prior to the end of the session. Augusto demonstrated fair understanding of the education provided. See EMR under Patient Instructions for exercises provided during therapy sessions.     Pt was advised to perform these exercises free of pain, and to stop performing them if pain occurs.    Assessment     Augusto Rutledge is a 29 y.o. male referred to outpatient occupational therapy and presents with a medical diagnosis of Right hand pain [M79.641] .  Patient presents with the following therapy deficits: Decreased  strength, Decreased pinch strength, Decreased functional hand use, and Increased pain and demonstrates limitations as described in the chart below.     Following medical record review, it is determined that the pt will benefit from " occupational therapy services in order to maximize pain free and/or functional use of his right hand. The following goals were discussed with the patient and patient is in agreement with them as to be addressed in the treatment plan. The patient's rehab potential is Fair.     Anticipated barriers to occupational therapy: co-morbid conditions; consistent compliance to HEP and therapy attendance as recommended; anxiety and guarding  Pt has no cultural, educational or language barriers to learning provided.    Medical Necessity is demonstrated by the following  Occupational Profile/History  Co-morbidities and personal factors that may impact the plan of care [x] LOW: Brief chart review  [] MODERATE: Expanded chart review   [] HIGH: Extensive chart review    Moderate / High Support Documentation: N/A     Examination  Performance deficits relating to physical, cognitive or psychosocial skills that result in activity limitations and/or participation restrictions  [x] LOW: addressing 1-3 Performance deficits  [] MODERATE: 3-5 Performance deficits  [] HIGH: 5+ Performance deficits (please support below)    Moderate / High Support Documentation:    Physical:  Muscle Power/Strength   Strength  Pinch Strength  Gross Motor Coordination  Pain    Cognitive:  No Deficits    Psychosocial:    No Deficits     Treatment Options [x] LOW: Limited options  [] MODERATE: Several options  [] HIGH: Multiple options      Decision Making/ Complexity Score: low       The following goals were discussed with the patient and patient is in agreement with them as to be addressed in the treatment plan.     Goals:   Short Term Goals: (4 weeks)  1. Pt will be independent with HEP.  2. Pt will report decreased pain to a 4/10 with ADL/IADL tasks.   3. Pt will make a full, flat, composite fist to enable grasping and squeezing objects for self-care.  4. Pt will report an increase in FOTO intake score of > 50%, which would indicate an improvement in  quality of life.    Long Term Goals: (8 weeks)  1. Pt will report decreased pain to 1-2/10 with ADL/IADL tasks.   2. Pt will exhibit 50-55# of right  strength to allow a firm grasp on cooking utensils, steering wheel, etc.  3. Pt will exhibit 18-20# of right functional tripod and lateral pinch strength to allow writing, opening containers, and turning keys.  4. Pt will report an increase in FOTO intake score of > 65%, which would indicate an improvement in quality of life.      Plan   Plan of Care Certification: 1/14/2025 to 2/25/2025     Outpatient Occupational Therapy 2 times weekly for 6 weeks to include the following interventions PRN: Manual Therapy, Moist Heat/ Ice, Neuromuscular Re-ed, Paraffin, Patient Education, Self Care, Therapeutic Activities, Therapeutic Exercise, and Ultrasound      Kaleigh Salvador OT

## 2025-01-15 NOTE — PLAN OF CARE
[facility]  Occupational Therapy Initial Evaluation      Date: 1/14/2025  Name: Augusto Rutledge  Clinic Number: [id number]     Therapy Diagnosis:        Encounter Diagnoses   Name Primary?    Right hand pain      Hand pain, right Yes    Decreased  strength of right hand      Decreased activities of daily living (ADL)        Physician: Yaakov Woodard NP     Physician Orders: OT eval and tx  Medical Diagnosis: Right hand pain [M79.641]      Evaluation Date: 1/14/2025  Insurance Authorization Period Expiration: 12/19/2024 to 12/19/2025  Plan of Care Certification Period: 1/14/2025 to 2/25/2025  Progress Note Due: 2/11/2025      Visit # / Visits authorized: 1/1  FOTO: 1/3                Date of Return to MD: PRN     Precautions:  Standard     Time In: 4:45  Time Out: 5:30  Total Appointment Time (timed & untimed codes): 45 minutes     Subjective      Involved Side: right  Dominant Side: Right     Date of Onset: 2 months ago  Mechanism of Injury/ History of Current Condition: problems with right hand with swelling for  the past couple of weeks.  Hit hand at work, but not sure exactly how.  Started having problems after that.  Using my migraine medication to help with the pain and I haven't taken anything else.     Imaging: No acute osseous or soft tissue abnormality.      Previous Therapy: none     Patient's Goals for Therapy: be able to use hand and lift things without hurting     Pain:  Functional Pain Scale Rating 0-10:   6/10 on average  6/10 at best  6/10 at worst  Location: palm of right hand at base of middle finger  Description: Sharp  Aggravating Factors: Lifting  Easing Factors: pain medication     Functional Limitations/Social History:     Previous Functional Status: Independent with all ADL/IADL tasks      Current Functional Status: unable to lift anything over 20#; pain when using      Home/Living environment: lives with their family                Driving: doesn't drive      Leisure: playstation games, but can't do currently     Occupation: [employer]  Working presently: unemployed  Duties: cleaning duties                              Past Medical History/Physical Systems Review:   Medical History:        Past Medical History:   Diagnosis Date    ADHD (attention deficit hyperactivity disorder)      Anxiety      Depression      Heart murmur      Hypertension      Migraines      Thyroid disease           Surgical History:    has a past surgical history that includes Eye surgery.     Medications:   has a current medication list which includes the following prescription(s): amlodipine-olmesartan, hydroxyzine pamoate, levothyroxine, and naproxen.     Allergies:   Review of patient's allergies indicates:  No Known Allergies         Objective      Observation/Inspection: Skin intact and Hypersensitive/painful to palpation at volar right MF metacarpal head.  No evidence of trigger finger response.     Sensation: Pt denies paresthesias. Intact to light touch. Hypersensitivity reported.      Edema: Circumferential measurements (in centimeters)    Right Left     1/14/2025 1/14/2025   MCPs 20.0 19.8   Long Proximal Phalanx 6.3 6.3             right Hand Active Range of Motion:   (Ext/Flex) Left Long Right Long     1/14/2025 1/14/2025   MCP Jt 0/80 0/86°   PIP Jt 0/104 0/93°   DIP Jt 0/70 0/75°   FRANK 254 254                                                           and Pinch Strength (in pounds, psi's):    Right Left     1/14/2025 1/14/2025    II 41* 63   Lateral 17* 21   Tripod 15* 17   *w/ pain localized over volar middle finger metacarpal        Intake Outcome Measure for FOTO Hand Survey     Therapist reviewed FOTO scores for Augusto Rutledge on 1/14/2025.   FOTO documents entered into [employer] - see Media section.     Intake Score: 48%      Predicted Functional Score: 64%      Treatment      Total Treatment time (time-based codes) separate from Evaluation: 35 minutes     Augusto received the  "treatments listed below:       Manual therapy techniques: Myofacial release and soft tissue mobilization for 15 minutes including:  -cupping and manual mobilization over MF metacarpal/A1 pulley  - application of "I" tape x2 w/ cross pattern over point of pain; instructed pt and his aunt in wear, care and precautions of use        therapeutic exercises to develop ROM for 10 minutes including:  - ROM, girth, /pinch strength, FOTO w/ review of status and discussion of plan w/ pt and his aunt     self-care techniques to improve independence and safety with ADL/IADL tasks for 0 minutes including:  -      neuromuscular re-education activities to improve Coordination and Proprioception for 0 minutes including:  -      therapeutic activities to improve functional performance for 0  minutes including:  -      Direct contact modalities after being cleared for contraindications:   -Ultrasound to right palm/volar MF, 3.0 MHz, .8 w/cm2, 50% duty cycle for 8 minutes to decrease pain/inflammation, increase circulation, and improve tissue extensibility     Home Exercise Program/Education:     Education provided:   - Role of OT, goals for OT, scheduling/cancellations, therapy attendance policy     Written Home Exercises Provided: Yes  Exercises were reviewed and Augusto was able to demonstrate them prior to the end of the session. Augusto demonstrated fair understanding of the education provided. See EMR under Patient Instructions for exercises provided during therapy sessions.      Pt was advised to perform these exercises free of pain, and to stop performing them if pain occurs.     Assessment      Augusto Rutledge is a 29 y.o. male referred to outpatient occupational therapy and presents with a medical diagnosis of Right hand pain [M79.641] .  Patient presents with the following therapy deficits: Decreased  strength, Decreased pinch strength, Decreased functional hand use, and Increased pain and demonstrates limitations as " described in the chart below.      Following medical record review, it is determined that the pt will benefit from occupational therapy services in order to maximize pain free and/or functional use of his right hand. The following goals were discussed with the patient and patient is in agreement with them as to be addressed in the treatment plan. The patient's rehab potential is Fair.      Anticipated barriers to occupational therapy: co-morbid conditions; consistent compliance to HEP and therapy attendance as recommended; anxiety and guarding  Pt has no cultural, educational or language barriers to learning provided.     Medical Necessity is demonstrated by the following  Occupational Profile/History  Co-morbidities and personal factors that may impact the plan of care [x] LOW: Brief chart review  [] MODERATE: Expanded chart review   [] HIGH: Extensive chart review     Moderate / High Support Documentation: N/A      Examination  Performance deficits relating to physical, cognitive or psychosocial skills that result in activity limitations and/or participation restrictions  [x] LOW: addressing 1-3 Performance deficits  [] MODERATE: 3-5 Performance deficits  [] HIGH: 5+ Performance deficits (please support below)     Moderate / High Support Documentation:     Physical:  Muscle Power/Strength   Strength  Pinch Strength  Gross Motor Coordination  Pain     Cognitive:  No Deficits     Psychosocial:    No Deficits      Treatment Options [x] LOW: Limited options  [] MODERATE: Several options  [] HIGH: Multiple options       Decision Making/ Complexity Score: low         The following goals were discussed with the patient and patient is in agreement with them as to be addressed in the treatment plan.      Goals:   Short Term Goals: (4 weeks)  1. Pt will be independent with HEP.  2. Pt will report decreased pain to a 4/10 with ADL/IADL tasks.   3. Pt will make a full, flat, composite fist to enable grasping and squeezing  objects for self-care.  4. Pt will report an increase in FOTO intake score of > 50%, which would indicate an improvement in quality of life.     Long Term Goals: (8 weeks)  1. Pt will report decreased pain to 1-2/10 with ADL/IADL tasks.   2. Pt will exhibit 50-55# of right  strength to allow a firm grasp on cooking utensils, steering wheel, etc.  3. Pt will exhibit 18-20# of right functional tripod and lateral pinch strength to allow writing, opening containers, and turning keys.  4. Pt will report an increase in FOTO intake score of > 65%, which would indicate an improvement in quality of life.        Plan   Plan of Care Certification: 1/14/2025 to 2/25/2025      Outpatient Occupational Therapy 2 times weekly for 6 weeks to include the following interventions PRN: Manual Therapy, Moist Heat/ Ice, Neuromuscular Re-ed, Paraffin, Patient Education, Self Care, Therapeutic Activities, Therapeutic Exercise, and Ultrasound        Kaleigh Salvador, OT

## 2025-02-03 ENCOUNTER — CLINICAL SUPPORT (OUTPATIENT)
Dept: REHABILITATION | Facility: HOSPITAL | Age: 30
End: 2025-02-03
Payer: MEDICAID

## 2025-02-03 DIAGNOSIS — R29.898 DECREASED GRIP STRENGTH OF RIGHT HAND: ICD-10-CM

## 2025-02-03 DIAGNOSIS — M79.641 HAND PAIN, RIGHT: Primary | ICD-10-CM

## 2025-02-03 DIAGNOSIS — Z78.9 DECREASED ACTIVITIES OF DAILY LIVING (ADL): ICD-10-CM

## 2025-02-03 PROCEDURE — 97530 THERAPEUTIC ACTIVITIES: CPT

## 2025-02-03 NOTE — PROGRESS NOTES
[facility]  Occupational Therapy Treatment Note     Date: 2/3/2025  Name: Augusto Rutledge  Clinic Number: [id number]    Therapy Diagnosis:   Encounter Diagnoses   Name Primary?    Hand pain, right Yes    Decreased  strength of right hand     Decreased activities of daily living (ADL)      Physician: Yaakov Woodard NP    Physician Orders: OT eval and tx  Medical Diagnosis: Right hand pain [M79.641]      Evaluation Date: 1/14/2025  Insurance Authorization Period Expiration: 12/19/2024 to 12/19/2025  Plan of Care Certification Period: 1/14/2025 to 2/25/2025  Progress Note Due: 2/11/2025      Visit # / Visits authorized: 1/20  FOTO: 1/3   Initial = 48% / Goal = 64%                Date of Return to MD: PRN     Precautions:  Standard    Time In: 09:50  Time Out: 10:30  Total Billable Time: 40 minutes    Subjective     Pt reports: my hand has still been hurting.  Hurt when helped my mom do stuff and did a high five with my cousin and I had to take pain medicine.  I took pain medicine this morning before coming here.    he was compliant with home exercise program given keeping tape on.  Response to previous treatment: the tape helped a little bit, but then it came off  Functional change: no change in gripping    Pain: 9/10 before I took the pain medicine this morning (6/10 on evaluation)  Location: right hand    Objective   Objective measures updated at progress report unless specified.    Treatment     Augusto received the treatments listed below:      therapeutic exercises to develop strength, endurance, and flexibility of right hand/middle finger for 10 minutes including:  - reviewed HEP issued at evaluation  - right finger extension to hook fisting, x10     manual therapy techniques: Myofacial release and soft tissue mobilization were applied to right hand/middle finger for 0 minutes including:   -cupping and manual mobilization over MF metacarpal/A1 pulley  -   neuromuscular  "re-education activities to improve Coordination, Kinesthetic, and Proprioception of right hand for 22 minutes including:  - Isospheres, CW in palm, 4 minutes  -in/out hand manipulation/sustained hold x 5 glass stones, 10 sets w/ Neoprene finger cuff    therapeutic activities to improve functional performance of right hand for 0 minutes including:  - NT    self-care techniques to improve independence and safety with ADL/IADL tasks for 0 minutes including:  - NT    direct contact modalities after being cleared for contraindications for 8 minutes including:  - Ultrasound to right palm/volar MF, 3.0 MHz, .8 w/cm2, 50% duty cycle for 8 minutes to decrease pain/inflammation, increase circulation, and improve tissue extensibility     supervised modalities after being cleared for contradictions for 0 minutes including:  - NT    Home Exercises and Education Provided     Education provided:   - reviewed HEP issued at evaluation  - provided printed information for [employer] ordering of Neoprene type trigger soft  - progress toward goals    Written Home Exercises Provided: Patient instructed to cont prior HEP  Exercises were reviewed and Augusto was able to demonstrate them prior to the end of the session. Augusto demonstrated good understanding of the HEP provided.     See EMR under Patient Instructions for exercises provided during prior visit.        Assessment     Augusto was seen for his first tx session since initial evaluation on 1/14/2025.  Patient continues to verbalize same pain and tenderness over middle finger A1 pulley, but is without active triggering.  Trial use of Neoprene trigger finger support that improves tolerance to pressure at palm.  Initiated ultrasound to localized area when patient verbalizes comments of "tickling" sensation extending into forearm.    Augusto is progressing towards his goals and there are no updates to goals at this time. Pt prognosis is Fair.     Augusto will continue to benefit from skilled " outpatient occupational therapy services to address the deficits listed in the problem list on initial evaluation, to provide pt/family education, and to maximize pt's level of independence in the home and community environment.     Pt's spiritual, cultural and educational needs considered and pt agreeable to plan of care and goals.    Anticipated barriers to occupational therapy: co-morbid conditions; consistent compliance to HEP and therapy attendance as recommended; anxiety and guarding     Goals:  Short Term Goals: (4 weeks)  1. Pt will be independent with HEP.  2. Pt will report decreased pain to a 4/10 with ADL/IADL tasks.   3. Pt will make a full, flat, composite fist to enable grasping and squeezing objects for self-care.  4. Pt will report an increase in FOTO intake score of > 50%, which would indicate an improvement in quality of life.     Long Term Goals: (8 weeks)  1. Pt will report decreased pain to 1-2/10 with ADL/IADL tasks.   2. Pt will exhibit 50-55# of right  strength to allow a firm grasp on cooking utensils, steering wheel, etc.  3. Pt will exhibit 18-20# of right functional tripod and lateral pinch strength to allow writing, opening containers, and turning keys.  4. Pt will report an increase in FOTO intake score of > 65%, which would indicate an improvement in quality of life.    Plan     Plan of Care Certification: 1/14/2025 to 2/25/2025      Outpatient Occupational Therapy 2 times weekly for 6 weeks to include the following interventions PRN: Manual Therapy, Moist Heat/ Ice, Neuromuscular Re-ed, Paraffin, Patient Education, Self Care, Therapeutic Activities, Therapeutic Exercise, and Ultrasound    Updates/Grading for next session: progress occupational therapy as tolerated    Kaleigh Salvador, OT

## 2025-02-12 ENCOUNTER — OFFICE VISIT (OUTPATIENT)
Dept: URGENT CARE | Facility: CLINIC | Age: 30
End: 2025-02-12
Payer: MEDICAID

## 2025-02-12 VITALS
SYSTOLIC BLOOD PRESSURE: 138 MMHG | HEIGHT: 63 IN | OXYGEN SATURATION: 100 % | TEMPERATURE: 98 F | WEIGHT: 315 LBS | DIASTOLIC BLOOD PRESSURE: 71 MMHG | HEART RATE: 66 BPM | BODY MASS INDEX: 55.81 KG/M2 | RESPIRATION RATE: 20 BRPM

## 2025-02-12 DIAGNOSIS — K08.89 PAIN, DENTAL: ICD-10-CM

## 2025-02-12 DIAGNOSIS — K02.9 DENTAL CARIES: Primary | ICD-10-CM

## 2025-02-12 PROCEDURE — 99213 OFFICE O/P EST LOW 20 MIN: CPT | Mod: S$GLB,,, | Performed by: PHYSICIAN ASSISTANT

## 2025-02-12 RX ORDER — AMOXICILLIN 500 MG/1
500 CAPSULE ORAL EVERY 12 HOURS
Qty: 20 CAPSULE | Refills: 0 | Status: SHIPPED | OUTPATIENT
Start: 2025-02-12 | End: 2025-02-22

## 2025-02-12 RX ORDER — NAPROXEN 500 MG/1
500 TABLET ORAL 2 TIMES DAILY
Qty: 20 TABLET | Refills: 0 | Status: SHIPPED | OUTPATIENT
Start: 2025-02-12 | End: 2025-02-12

## 2025-02-12 RX ORDER — IBUPROFEN 800 MG/1
800 TABLET ORAL EVERY 8 HOURS PRN
Qty: 20 TABLET | Refills: 0 | Status: SHIPPED | OUTPATIENT
Start: 2025-02-12

## 2025-02-13 NOTE — PROGRESS NOTES
"Subjective:      Patient ID: Augusto Rutledge is a 29 y.o. male.    Vitals:  height is 5' 3" (1.6 m) and weight is 143.9 kg (317 lb 3.9 oz) (abnormal). His oral temperature is 97.8 °F (36.6 °C). His blood pressure is 138/71 and his pulse is 66. His respiration is 20 and oxygen saturation is 100%.     Chief Complaint: Dental Pain    Pt c/o tooth ache onset 6 days.  Lower left side.  States he has had issues with follow up with dentists due to his insurance.  No facial swelling.  Unknown dental disease.  No drainage from tooth.    Dental Pain   The dental pain is located in He's tooth (teeth). The problem has been gradually worsening. The pain is at a severity of 10/10. Associated symptoms include difficulty swallowing and facial pain. Pertinent negatives include no fever, oral bleeding, sinus pressure or thermal sensitivity. Treatments tried: oral gel , water ( helps ease pain)       Constitution: Negative for fever.   HENT:  Negative for sinus pressure.       Objective:     Physical Exam   Constitutional: He is oriented to person, place, and time. He appears well-developed. He is cooperative.  Non-toxic appearance. He does not appear ill. No distress.   HENT:   Head: Normocephalic and atraumatic.   Ears:   Right Ear: Hearing and external ear normal.   Left Ear: Hearing and external ear normal.   Nose: Nose normal. No mucosal edema, rhinorrhea or nasal deformity. No epistaxis. Right sinus exhibits no maxillary sinus tenderness and no frontal sinus tenderness. Left sinus exhibits no maxillary sinus tenderness and no frontal sinus tenderness.   Mouth/Throat: Uvula is midline, oropharynx is clear and moist and mucous membranes are normal. No trismus in the jaw. Normal dentition. No uvula swelling. No oropharyngeal exudate, posterior oropharyngeal edema or posterior oropharyngeal erythema.       Eyes: Conjunctivae and lids are normal. No scleral icterus.   Neck: Trachea normal and phonation normal. Neck supple. No edema " present. No erythema present. No neck rigidity present.   Cardiovascular: Normal rate, regular rhythm, normal heart sounds and normal pulses.   Pulmonary/Chest: Effort normal and breath sounds normal. No respiratory distress. He has no decreased breath sounds. He has no rhonchi.   Abdominal: Normal appearance.   Musculoskeletal: Normal range of motion.         General: No deformity. Normal range of motion.   Neurological: He is alert and oriented to person, place, and time. He exhibits normal muscle tone. Coordination normal.   Skin: Skin is warm, dry, intact, not diaphoretic and not pale.   Psychiatric: His speech is normal and behavior is normal. Judgment and thought content normal.   Nursing note and vitals reviewed.      Assessment:     1. Dental caries    2. Pain, dental        Plan:       Dental caries  -     amoxicillin (AMOXIL) 500 MG capsule; Take 1 capsule (500 mg total) by mouth every 12 (twelve) hours. for 10 days  Dispense: 20 capsule; Refill: 0  -     ibuprofen (ADVIL,MOTRIN) 800 MG tablet; Take 1 tablet (800 mg total) by mouth every 8 (eight) hours as needed for Pain.  Dispense: 20 tablet; Refill: 0    Pain, dental  -     amoxicillin (AMOXIL) 500 MG capsule; Take 1 capsule (500 mg total) by mouth every 12 (twelve) hours. for 10 days  Dispense: 20 capsule; Refill: 0  -     Discontinue: naproxen (NAPROSYN) 500 MG tablet; Take 1 tablet (500 mg total) by mouth 2 (two) times daily.  Dispense: 20 tablet; Refill: 0  -     ibuprofen (ADVIL,MOTRIN) 800 MG tablet; Take 1 tablet (800 mg total) by mouth every 8 (eight) hours as needed for Pain.  Dispense: 20 tablet; Refill: 0      Antibiotics and Ibuprofen sent to pharmacy.  Recommend follow up with dentist in 2 weeks.  Likely needs tooth extraction.  RTC or go to the ER with new or worsening symptoms.

## 2025-03-13 ENCOUNTER — DOCUMENTATION ONLY (OUTPATIENT)
Dept: REHABILITATION | Facility: HOSPITAL | Age: 30
End: 2025-03-13
Payer: MEDICAID

## 2025-03-13 PROBLEM — R29.898 DECREASED GRIP STRENGTH OF RIGHT HAND: Status: RESOLVED | Noted: 2025-01-14 | Resolved: 2025-03-13

## 2025-03-13 PROBLEM — M79.641 HAND PAIN, RIGHT: Status: RESOLVED | Noted: 2025-01-14 | Resolved: 2025-03-13

## 2025-03-13 PROBLEM — Z78.9 DECREASED ACTIVITIES OF DAILY LIVING (ADL): Status: RESOLVED | Noted: 2025-01-14 | Resolved: 2025-03-13

## 2025-03-13 NOTE — PROGRESS NOTES
[facility]   Occupational Therapy Discharge Note    Name: Augusto Rutledge  Clinic Number: [id number]    Therapy Diagnosis:        Encounter Diagnoses   Name Primary?    Hand pain, right Yes    Decreased  strength of right hand      Decreased activities of daily living (ADL)        Physician: Yaakov Woodard NP     Physician Orders: OT eval and tx  Medical Diagnosis: Right hand pain [M79.641]   Evaluation Date: 1/14/2025      Date of Last visit:  2/3/2025  Total Visits Received: 2    ASSESSMENT      See daily note    Discharge reason: Patient has not attended therapy since 2/3/2025 and we have not heard back from patient at this time.    Discharge FOTO Score: See daily note    Goals: See daily note    PLAN   This patient is discharged from occupational therapy.      MILLER Bonilla, KEYAT

## 2025-03-19 DIAGNOSIS — I10 ELEVATED BLOOD PRESSURE READING WITH DIAGNOSIS OF HYPERTENSION: ICD-10-CM

## 2025-03-27 DIAGNOSIS — I10 ESSENTIAL HYPERTENSION: ICD-10-CM

## 2025-03-27 NOTE — TELEPHONE ENCOUNTER
Care Due:                  Date            Visit Type   Department     Provider  --------------------------------------------------------------------------------                                ESTABLISHED                              PATIENT -    [facility] PRIMARY  Last Visit: 07-      [facility]3]      CARE           Honey Wyatt  Next Visit: None Scheduled  None         None Found                                                            Last  Test          Frequency    Reason                     Performed    Due Date  --------------------------------------------------------------------------------    TSH.........  12 months..  levothyroxine............  03- 03-    [facility] Embedded Care Due Messages. Reference number: [id number].   3/27/2025 9:42:45 AM CDT

## 2025-03-27 NOTE — TELEPHONE ENCOUNTER
Refill Routing Note   Medication(s) are not appropriate for processing by [facility] for the following reason(s):        Required labs outdated    [facility] action(s):  Defer     Requires labs : Yes             Appointments  past 12m or future 3m with PCP    Date Provider   Last Visit   7/25/2024 Honey Wyatt MD   Next Visit   Visit date not found Honey Wyatt MD   ED visits in past 90 days: 0        Note composed:2:09 PM 03/27/2025

## 2025-04-01 RX ORDER — AMLODIPINE AND OLMESARTAN MEDOXOMIL 5; 20 MG/1; MG/1
1 TABLET ORAL
Qty: 90 TABLET | Refills: 0 | Status: SHIPPED | OUTPATIENT
Start: 2025-04-01

## 2025-05-20 ENCOUNTER — TELEPHONE (OUTPATIENT)
Dept: PRIMARY CARE CLINIC | Facility: CLINIC | Age: 30
End: 2025-05-20
Payer: MEDICAID

## 2025-05-20 NOTE — TELEPHONE ENCOUNTER
----- Message from Yesika sent at 5/20/2025 11:38 AM CDT -----  Type:  Patient Returning CallWho Called:Say/ Daniel Jeronimo Left Message for Patient:Does the patient know what this is regarding?:90L FormWould the patient rather a call back or a response via [facility]? CallbackBe Call Back Number:[phone]   fax [phone] Information: Form was faxed on 05/19/25 calling for status

## 2025-05-23 ENCOUNTER — OFFICE VISIT (OUTPATIENT)
Dept: PRIMARY CARE CLINIC | Facility: CLINIC | Age: 30
End: 2025-05-23
Payer: MEDICAID

## 2025-05-23 DIAGNOSIS — G43.819 OTHER MIGRAINE WITHOUT STATUS MIGRAINOSUS, INTRACTABLE: ICD-10-CM

## 2025-05-23 DIAGNOSIS — Z13.220 LIPID SCREENING: ICD-10-CM

## 2025-05-23 DIAGNOSIS — Z13.1 SCREENING FOR DIABETES MELLITUS: ICD-10-CM

## 2025-05-23 DIAGNOSIS — R47.9 SPEECH DISTURBANCE, UNSPECIFIED TYPE: ICD-10-CM

## 2025-05-23 DIAGNOSIS — I10 ESSENTIAL HYPERTENSION: Primary | ICD-10-CM

## 2025-05-23 PROCEDURE — 98004 SYNCH AUDIO-VIDEO EST SF 10: CPT | Mod: 95,,, | Performed by: NURSE PRACTITIONER

## 2025-05-23 PROCEDURE — 1160F RVW MEDS BY RX/DR IN RCRD: CPT | Mod: CPTII,95,, | Performed by: NURSE PRACTITIONER

## 2025-05-23 PROCEDURE — 4010F ACE/ARB THERAPY RXD/TAKEN: CPT | Mod: CPTII,95,, | Performed by: NURSE PRACTITIONER

## 2025-05-23 PROCEDURE — 3044F HG A1C LEVEL LT 7.0%: CPT | Mod: CPTII,95,, | Performed by: NURSE PRACTITIONER

## 2025-05-23 PROCEDURE — 1159F MED LIST DOCD IN RCRD: CPT | Mod: CPTII,95,, | Performed by: NURSE PRACTITIONER

## 2025-05-23 RX ORDER — NAPROXEN 500 MG/1
500 TABLET ORAL 2 TIMES DAILY PRN
Qty: 60 TABLET | Refills: 2 | Status: SHIPPED | OUTPATIENT
Start: 2025-05-23

## 2025-05-26 ENCOUNTER — CLINICAL SUPPORT (OUTPATIENT)
Dept: PRIMARY CARE CLINIC | Facility: CLINIC | Age: 30
End: 2025-05-26
Payer: MEDICAID

## 2025-05-26 ENCOUNTER — LAB VISIT (OUTPATIENT)
Dept: LAB | Facility: HOSPITAL | Age: 30
End: 2025-05-26
Attending: NURSE PRACTITIONER
Payer: MEDICAID

## 2025-05-26 VITALS
HEART RATE: 60 BPM | HEIGHT: 63 IN | TEMPERATURE: 98 F | DIASTOLIC BLOOD PRESSURE: 82 MMHG | OXYGEN SATURATION: 99 % | BODY MASS INDEX: 55.81 KG/M2 | WEIGHT: 315 LBS | SYSTOLIC BLOOD PRESSURE: 138 MMHG

## 2025-05-26 DIAGNOSIS — Z13.220 LIPID SCREENING: ICD-10-CM

## 2025-05-26 DIAGNOSIS — I10 ESSENTIAL HYPERTENSION: Primary | ICD-10-CM

## 2025-05-26 DIAGNOSIS — Z13.1 SCREENING FOR DIABETES MELLITUS: ICD-10-CM

## 2025-05-26 DIAGNOSIS — I10 ESSENTIAL HYPERTENSION: ICD-10-CM

## 2025-05-26 LAB
ABSOLUTE EOSINOPHIL (OHS): 0.09 K/UL
ABSOLUTE MONOCYTE (OHS): 0.44 K/UL (ref 0.3–1)
ABSOLUTE NEUTROPHIL COUNT (OHS): 1.98 K/UL (ref 1.8–7.7)
ALBUMIN SERPL BCP-MCNC: 3.6 G/DL (ref 3.5–5.2)
ALP SERPL-CCNC: 66 UNIT/L (ref 40–150)
ALT SERPL W/O P-5'-P-CCNC: 10 UNIT/L (ref 10–44)
ANION GAP (OHS): 8 MMOL/L (ref 8–16)
AST SERPL-CCNC: 15 UNIT/L (ref 11–45)
BASOPHILS # BLD AUTO: 0.03 K/UL
BASOPHILS NFR BLD AUTO: 0.6 %
BILIRUB SERPL-MCNC: 0.6 MG/DL (ref 0.1–1)
BUN SERPL-MCNC: 8 MG/DL (ref 6–20)
CALCIUM SERPL-MCNC: 8.4 MG/DL (ref 8.7–10.5)
CHLORIDE SERPL-SCNC: 104 MMOL/L (ref 95–110)
CHOLEST SERPL-MCNC: 167 MG/DL (ref 120–199)
CHOLEST/HDLC SERPL: 3.5 {RATIO} (ref 2–5)
CO2 SERPL-SCNC: 25 MMOL/L (ref 23–29)
CREAT SERPL-MCNC: 0.8 MG/DL (ref 0.5–1.4)
EAG (OHS): 100 MG/DL (ref 68–131)
ERYTHROCYTE [DISTWIDTH] IN BLOOD BY AUTOMATED COUNT: 12 % (ref 11.5–14.5)
GFR SERPLBLD CREATININE-BSD FMLA CKD-EPI: >60 ML/MIN/1.73/M2
GLUCOSE SERPL-MCNC: 86 MG/DL (ref 70–110)
HBA1C MFR BLD: 5.1 % (ref 4–5.6)
HCT VFR BLD AUTO: 41.9 % (ref 40–54)
HDLC SERPL-MCNC: 48 MG/DL (ref 40–75)
HDLC SERPL: 28.7 % (ref 20–50)
HGB BLD-MCNC: 13.9 GM/DL (ref 14–18)
IMM GRANULOCYTES # BLD AUTO: 0.01 K/UL (ref 0–0.04)
IMM GRANULOCYTES NFR BLD AUTO: 0.2 % (ref 0–0.5)
LDLC SERPL CALC-MCNC: 103.2 MG/DL (ref 63–159)
LYMPHOCYTES # BLD AUTO: 2.12 K/UL (ref 1–4.8)
MCH RBC QN AUTO: 31.6 PG (ref 27–31)
MCHC RBC AUTO-ENTMCNC: 33.2 G/DL (ref 32–36)
MCV RBC AUTO: 95 FL (ref 82–98)
NONHDLC SERPL-MCNC: 119 MG/DL
NUCLEATED RBC (/100WBC) (OHS): 0 /100 WBC
PLATELET # BLD AUTO: 357 K/UL (ref 150–450)
PMV BLD AUTO: 9.4 FL (ref 9.2–12.9)
POTASSIUM SERPL-SCNC: 3.9 MMOL/L (ref 3.5–5.1)
PROT SERPL-MCNC: 7.4 GM/DL (ref 6–8.4)
RBC # BLD AUTO: 4.4 M/UL (ref 4.6–6.2)
RELATIVE EOSINOPHIL (OHS): 1.9 %
RELATIVE LYMPHOCYTE (OHS): 45.4 % (ref 18–48)
RELATIVE MONOCYTE (OHS): 9.4 % (ref 4–15)
RELATIVE NEUTROPHIL (OHS): 42.5 % (ref 38–73)
SODIUM SERPL-SCNC: 137 MMOL/L (ref 136–145)
TRIGL SERPL-MCNC: 79 MG/DL (ref 30–150)
WBC # BLD AUTO: 4.67 K/UL (ref 3.9–12.7)

## 2025-05-26 PROCEDURE — 80053 COMPREHEN METABOLIC PANEL: CPT

## 2025-05-26 PROCEDURE — 36415 COLL VENOUS BLD VENIPUNCTURE: CPT | Mod: PN

## 2025-05-26 PROCEDURE — 80061 LIPID PANEL: CPT

## 2025-05-26 PROCEDURE — 85025 COMPLETE CBC W/AUTO DIFF WBC: CPT

## 2025-05-26 PROCEDURE — 83036 HEMOGLOBIN GLYCOSYLATED A1C: CPT

## 2025-05-26 PROCEDURE — 99999 PR PBB SHADOW E&M-EST. PATIENT-LVL III: CPT | Mod: PBBFAC,,,

## 2025-05-26 PROCEDURE — 99213 OFFICE O/P EST LOW 20 MIN: CPT | Mod: PBBFAC,PN

## 2025-05-27 ENCOUNTER — RESULTS FOLLOW-UP (OUTPATIENT)
Dept: PRIMARY CARE CLINIC | Facility: CLINIC | Age: 30
End: 2025-05-27

## 2025-06-01 NOTE — PROGRESS NOTES
Subjective:       Patient ID: Augusto Rutledge is a 29 y.o. male.    Chief Complaint: HTN, Migraine, and Sleep Disturbance  The patient location is:[place], [place]    Visit type: audiovisual-Synchronous      Face to Face time with patient: 11 min  20  minutes of total time spent on the encounter, which includes face to face time and non-face to face time preparing to see the patient (eg, review of tests), Obtaining and/or reviewing separately obtained history, Documenting clinical information in the electronic or other health record, Independently interpreting results (not separately reported) and communicating results to the patient/family/caregiver, or Care coordination (not separately reported).         Each patient to whom he or she provides medical services by telemedicine is:  (1) informed of the relationship between the physician and patient and the respective role of any other health care provider with respect to management of the patient; and (2) notified that he or she may decline to receive medical services by telemedicine and may withdraw from such care at any time.       History of Present Illness:   Augusto Rutledge 29 y.o. male presents today for medication management and refills for HTN, Migraine Headache and to address care gaps. Patient denies any other problems or concerns at this time. Treatment options and alternatives were discussed with the patient. Patient provided opportunity to ask additional questions.  All questions were answered. Voices understanding and acceptance of this advice. Instructed to call back if any further questions or concerns.    Past Medical History:   Diagnosis Date    ADHD (attention deficit hyperactivity disorder)     Anxiety     Depression     Heart murmur     Hypertension     Migraines     Thyroid disease      Family History   Problem Relation Name Age of Onset    Depression Mother      Arthritis Mother      Hypertension Mother       Social History[1]  Encounter  Medications[2]    Review of Systems   Constitutional:  Negative for activity change.   HENT:  Negative for hearing loss and trouble swallowing.    Eyes:  Negative for discharge.   Respiratory:  Negative for chest tightness and wheezing.    Cardiovascular:  Negative for chest pain and palpitations.   Gastrointestinal:  Negative for constipation, diarrhea and vomiting.   Genitourinary:  Negative for difficulty urinating and hematuria.   Neurological:  Positive for headaches.   Psychiatric/Behavioral:  Negative for dysphoric mood.        Objective:      There were no vitals taken for this visit.                Results for orders placed or performed in visit on 06/12/24   SARS Coronavirus 2 Antigen, POCT Manual Read    Collection Time: 06/12/24  9:26 AM   Result Value Ref Range    SARS Coronavirus 2 Antigen Negative Negative     Acceptable Yes      Assessment:       1. Essential hypertension    2. Other migraine without status migrainosus, intractable    3. Speech disturbance, unspecified type    4. Lipid screening    5. BMI 50.0-59.9, adult    6. Screening for diabetes mellitus    Assessment & Plan             Plan:   Essential hypertension  -     CBC Auto Differential; Future; Expected date: 05/23/2025  -     Comprehensive Metabolic Panel; Future; Expected date: 05/23/2025    Other migraine without status migrainosus, intractable  -     naproxen (NAPROSYN) 500 MG tablet; Take 1 tablet (500 mg total) by mouth 2 (two) times daily as needed (migraine).  Dispense: 60 tablet; Refill: 2    Speech disturbance, unspecified type    Lipid screening  -     Lipid Panel; Future; Expected date: 05/23/2025    BMI 50.0-59.9, adult    Screening for diabetes mellitus  -     Hemoglobin A1C; Future; Expected date: 05/23/2025      Today's encounter took a total time of 20 minutes, and that time included Preparing to see the patient (review records, tests), Obtaining and/or reviewing separately obtained historical data,  Performing a medically appropriate examination and/or evaluation , Counseling & educating the patient/family/caregiver on treatment plan with chronic health conditions , ordering medications, tests, and/or procedures, Referring and communicating with other healthcare professionals , Documenting clinical information in the electronic or other health record, Independently interpreting results & communicating results to the patient/family/caregiver.           [facility]   [address]  [place] [address]  Office [phone]  Fax [phone]   This note was generated with the assistance of ambient listening technology. Verbal consent was obtained by the patient and accompanying visitor(s) for the recording of patient appointment to facilitate this note. I attest to having reviewed and edited the generated note for accuracy, though some syntax or spelling errors may persist. Please contact the author of this note for any clarification.              [1]   Social History  Socioeconomic History    Marital status: Single   Tobacco Use    Smoking status: Never     Passive exposure: Never    Smokeless tobacco: Never   Substance and Sexual Activity    Alcohol use: Yes     Comment: occ    Drug use: Never    Sexual activity: Not Currently     Social Drivers of Health     Financial Resource Strain: Medium Risk (5/23/2025)    Overall Financial Resource Strain (CARDIA)     Difficulty of Paying Living Expenses: Somewhat hard   Food Insecurity: No Food Insecurity (5/23/2025)    Hunger Vital Sign     Worried About Running Out of Food in the Last Year: Never true     Ran Out of Food in the Last Year: Never true   Transportation Needs: No Transportation Needs (5/23/2025)    PRAPARE - Transportation     Lack of Transportation (Medical): No     Lack of Transportation (Non-Medical): No   Physical Activity: Insufficiently Active (5/23/2025)    Exercise Vital Sign     Days of Exercise per  Week: 3 days     Minutes of Exercise per Session: 20 min   Stress: No Stress Concern Present (5/23/2025)    [employer] [place] [employer] - Occupational Stress Questionnaire     Feeling of Stress : Only a little   Housing Stability: Low Risk  (5/23/2025)    Housing Stability Vital Sign     Unable to Pay for Housing in the Last Year: No     Number of Times Moved in the Last Year: 1     Homeless in the Last Year: No   [2]   Outpatient Encounter Medications as of 5/23/2025   Medication Sig Dispense Refill    amlodipine-olmesartan (CHERIE) 5-20 mg per tablet TAKE 1 TABLET BY MOUTH EVERY DAY 90 tablet 0    hydrOXYzine pamoate (VISTARIL) 25 MG Cap Take 1 capsule (25 mg total) by mouth 4 (four) times daily. (Patient not taking: Reported on 5/26/2025) 30 capsule 0    ibuprofen (ADVIL,MOTRIN) 800 MG tablet Take 1 tablet (800 mg total) by mouth every 8 (eight) hours as needed for Pain. 20 tablet 0    levothyroxine (SYNTHROID) 75 MCG tablet Take 1 tablet (75 mcg total) by mouth once daily. 30 tablet 2    naproxen (NAPROSYN) 500 MG tablet Take 1 tablet (500 mg total) by mouth 2 (two) times daily as needed (migraine). 60 tablet 2    [DISCONTINUED] naproxen (NAPROSYN) 500 MG tablet Take 1 tablet (500 mg total) by mouth 2 (two) times daily as needed (migraine). 60 tablet 2     No facility-administered encounter medications on file as of 5/23/2025.

## 2025-06-10 ENCOUNTER — PATIENT MESSAGE (OUTPATIENT)
Dept: PRIMARY CARE CLINIC | Facility: CLINIC | Age: 30
End: 2025-06-10
Payer: MEDICAID

## 2025-06-12 DIAGNOSIS — R47.9 SPEECH DISTURBANCE, UNSPECIFIED TYPE: Primary | ICD-10-CM

## 2025-06-19 ENCOUNTER — CLINICAL SUPPORT (OUTPATIENT)
Dept: REHABILITATION | Facility: HOSPITAL | Age: 30
End: 2025-06-19
Attending: FAMILY MEDICINE
Payer: MEDICAID

## 2025-06-19 DIAGNOSIS — R47.9 SPEECH DISTURBANCE, UNSPECIFIED TYPE: ICD-10-CM

## 2025-06-19 DIAGNOSIS — R48.2 APRAXIA OF SPEECH: Primary | ICD-10-CM

## 2025-06-19 PROCEDURE — 92523 SPEECH SOUND LANG COMPREHEN: CPT | Performed by: SPEECH-LANGUAGE PATHOLOGIST

## 2025-06-19 NOTE — PROGRESS NOTES
Outpatient Rehab    Speech-Language Pathology Evaluation (only)    Patient Name: Augusto Rutledge  MRN: [id number]  YOB: 1995  Encounter Date: 6/19/2025    Therapy Diagnosis:   Encounter Diagnoses   Name Primary?    Speech disturbance, unspecified type     Apraxia of speech Yes     Physician: Honey Wyatt MD    Physician Orders: Eval and Treat  Medical Diagnosis: Speech disturbance, unspecified type  Surgical Diagnosis: Not applicable for this Episode   Surgical Date: Not applicable for this Episode  Days Since Last Surgery: Not applicable for this Episode    Visit # / Visits Authorized: 1 / 1   Insurance Authorization Period: 6/12/2025 to 12/31/2025  Date of Evaluation: 6/19/2025      Time In: 1415   Time Out: 1501  Total Time (in minutes): 46   Total Billable Time (in minutes):  46 minutes       Subjective   History of Present Illness  Augusto is a 29 y.o. male                                     Patient presents with his mom today; both provided case history. Patient's mom reports patient was born a preemie and was diagnosed with intellectual disability. Patient's mom reports he was a late talker and very difficult to understand when he did start talking around 1 years old. He had speech therapy when he was about 2 years old through [facility] [employer]. He has spoken clearer as he has aged but he participated in speech therapy consistently throughout high school (2013). Now, patient's mom reports he continues to not always be able to speak clearly. Patient and his mom have moved around a lot, and now they are little more stable and wanting to re-start services. He reports he was primarily addressing articulatory precision when he last worked in speech therapy. He reports some sounds are difficult for him and he will just avoid the word or the subject to avoid stating it. He doesn't really feel like the issue is stuttering-like in nature. His mom reports he sometimes has difficulty comprehending what he is  asked to do. Patient reports his goal for coming to speech therapy is to speak more clearly.          Past Medical History/Physical Systems Review:   Augusto Rutledge  has a past medical history of ADHD (attention deficit hyperactivity disorder), Anxiety, Depression, Heart murmur, Hypertension, Migraines, and Thyroid disease.    Augusto Rutledge  has a past surgical history that includes Eye surgery.    Augusto has a current medication list which includes the following prescription(s): amlodipine-olmesartan, hydroxyzine pamoate, ibuprofen, levothyroxine, and naproxen.    Review of patient's allergies indicates:  No Known Allergies     Objective          MOTOR SPEECH/DYSARTHRIA EVALUATION    History  Diagnosis: developmental disability   Localization: NA  Associated Deficits: Cognitive/Linguistic      Evaluation of Speech Mechanisms  Respiration:  Posture: WNL  Breath Support: WNL  Respiratory Features: Abnormal: Thoracic    Oral Mechanism at Rest   Labial Structures  Structure WNL   Symmetry WNL   Tone WNL   Ability to control secretions WNL       Lingual Structure (at rest in mouth)   Structure WNL   Symmetry WNL   Tone WNL   Irregular Movement WNL     Mandible  Symmetry WNL   Posture at Rest WNL=closed   Dentition WNL     Face  Symmetry WNL   Expression WNL   Irregular Movement WNL     Soft Palate  Symmetry WNL   Structure WNL     Hard Palate  Structure WNL     Oral Mechanism during Sustained Postures   Labial Retraction   Symmetry WNL   Range WNL   Tone WNL   Strength WNL     Labial Protrusion  Range WNL   Tone WNL   Strength WNL     Labial Compression  Strength (Upper R) WNL   Strength (Upper L) WNL   Strength (Lower R) WNL   Strength (Lower L) WNL     Lingual Protrusion  Symmetry WNL   Range WNL   Tone WNL   Tremor WNL   Strength WNL     Lingual Elevation to Alveolar Ridge   Range WNL   Symmetry WNL     Mandible Depression  Symmetry WNL   Range WNL   Jaw Clonus WNL   Strength WNL     Mandible Elevation  Symmetry WNL   Range  WNL   Strength WNL     Face: Raising Eyebrows  Symmetry WNL   Range WNL   Forehead Wrinkle WNL     Velopharyngeal Function: Cheek Puffing   Symmetry WNL   Range WNL   Tone WNL   Strength WNL       Oral Mechanism during Movement   Labial Protrusion and Lateralization   Rate WNL   Rhythm WNL     Lingual Lateralization (External)   Rate WNL   Rhythm WNL   Range WNL     Lingual Lateralization (Internal)  Rate WNL   Rhythm WNL   Range WNL     Circular Range of Motion (External)   Rate WNL   Rhythm WNL   Range WNL     Circular Range of Motion (Internal)  Rate WNL   Rhythm WNL   Range WNL     Velopharyngeal Function: Sustained /a/  Velum Range WNL   Pharyngeal Wall Range WNL     Velopharyngeal Function: Short Repeating /a/  Velum Range WNL   Pharyngeal Wall Range WNL     Gross Sensation  Sensation  Face: Upper L WNL    Upper R WNL    Lower L WNL    Lower R WNL   Lips: Upper L WNL    Upper R WNL    Lower L WNL    Lower R WNL         Alternating Motion Rates (AMRs) and Sequential Motion Rates (SMRs)  SMRs /pu/  Rate Slow   Rhythm Irregular   Accuracy WNL   Norm 5.0-7.1 Allison/Sec Below norm     SMRs /tu/  Rate Slow   Rhythm Irregular   Accuracy WNL   Norm 4.8-7.1 Allison/Sec Below norm     SMRs /ku/  Rate Slow   Rhythm Irregular   Accuracy WNL   Norm 4.4-7.5 Allison/Sec Below norm     AMRs /putuku/  Rate Slow   Rhythm Irregular   Accuracy WNL   Norm 3.6-7.5 Allison/Sec Below norm     Perceptual Ratings  Respiratory Features: NA  Respiratory/Phonatory Features: NA  Phonatory Features: Monopitch  Phonatory Quality: NA  Resonatory Features: NA  Articulatory Features: Irregular articulatory breakdowns  Prosodic Features: Short phrases  Other Features: NA      Articulation Assessment:  The Samuels Fristoe Test of Articulation - Third Edition (GFTA-3) was administered to assess Augusto's production of consonants at the individual word and sentence level. This assessment consisted of a series of color pictures, which Augusto was asked to label  following specific instructions. Responses were recorded and analyzed to determine the presence/absence of an articulation delay/disorder.       Sounds-in-words Phonetic Error Analysis  Sound Initial Medial Final   p      b      t      d      k      kw      g      m      n      ng      f      v      ?      ð      s      z      ?      t?      d?      r      l       ?      w      j      h      Blends Initial Medial Final   bl      br      dr d     fr      gl      gr      kr      kw      nt      pl      pr      sl      st      sw      sp      tr t         The Apraxia Battery for Adults - 2nd Edition (LUIS E-2) was administered to determine the presence of apraxia of speech, the severity level of apraxia of speech, and guide therapeutic interventions. It includes five subtests that assess diadochokinetic rate, increasing word length, limb and oral apraxia, utterance time for polysyllabic words, and repeated trials with task deterioration. Results are detailed in the table below:    Subtest  Raw Score Level of Impairment   Diadochokinetic Rate 3 moderate   Increasing Word Length (a) 10 severe   Increasing Word Length (b) 10 severe   Limb Apraxia 50 none   Oral Apraxia 50 none   Utterance Time for Polysyllabic Words 5 none   Repeated Trials -2 severe           Time Entry(in minutes):  Speech Sound Prod Eval w/ Lang Comp and Exp Time Entry: 46    Assessment & Plan   Assessment   Augusto presents with symptoms that are Stable.          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 He produced 3 repetitions in a diadochokinetic rate task (norm 26+). He repeated words of increasing length with 60% accuracy with a deterioration score (based on increasing word length) of 10 (norm 0-1). He produced words of increasing length with phoneme clusters with 57% accuracy independently and a deterioration score (based on increasing word length) of 10 (norm 0-1). Particular difficulty noted when producing  "phonemes with /r/ blends. Augusto completed oral and limb apraxia tasks with 100% accuracy indicating the absence of oral or limb apraxia. In a latency / utterance time task, Augusto averaged 1 second before initiating a word and produced a word given picture stimuli in an average of 0 seconds. In a repeated trials task, Augusto produced polysyllabic words three times after a model. Errors for the first and third trial were calculated. This subtest assesses the production of words over successive trials. He produced 3 polysllabic words that deteriorated (errors increased) and 0 polysyllabic words that improved with repetition. Spontaneous speech, oral reading, and automatic speech tasks were also assessed and patient presented with following characteristics of apraxia of speech: phonemic anticipatory errors, phonemic transposition errors, audible searching, highly inconsistent nature of errors, fewer errors in automatic vs spontaneous speech, difficulty initiating speech, abnormal prosodic features, with a receptive/expressive gap. Additionally, patient with no cranial nerve strength or range of motion impairments noted on exam, with no dysarthria noted. Articulation inventory was significant for inconsistent transposition and de-clustering errors such as "tuck" for "truck" as well as substitution "kitar" for "guitar", more consistent with apraxia of speech given inconsistency of errors rather than true mis-articulation.     Personal Factors Affecting Prognosis: Cognitive impairment    Evaluation/Treatment Response: Patient responded to treatment well     Prognosis: Good       Plan  From a speech language pathology perspective, the patient would benefit from: Skilled Rehab Services  Planned therapy interventions and modalities include: Speech/language therapy.              Visit Frequency: 1 times Per Week for 10 Weeks.       This plan was discussed with Patient and Caregiver.   Discussion participants: Agreed Upon " Plan of Care           The patient's spiritual, cultural, and educational needs were considered, and the patient is agreeable to the plan of care and goals.     Goals:   Active       He will communicate wants and needs effectively to different conversational partners, maintain safety, and participate socially in functional living environment.           Patient will participate in sound production treatment of /r/ in all positions of two syllable words with 90% accuracy.        Start:  06/19/25    Expected End:  08/28/25            Patient will produce spontaneous, higher-level complexity verbal output (I.e., given a topic, present his opinion) with appropriate rate and no more than 1-2 apraxic errors per minute with 80% accuracy.       Start:  06/19/25    Expected End:  08/28/25            Patient will describe functional objects/images to facilitate a strategy of description/improve articulatory precision in order to increase transfer of intended message with 80% accuracy and moderate clinician cueing.        Start:  06/19/25    Expected End:  08/28/25                Michelle Ortiz MA, L-SLP, CCC-SLP  Speech Language Pathologist  6/19/2025

## 2025-07-23 DIAGNOSIS — I10 ESSENTIAL HYPERTENSION: ICD-10-CM

## 2025-07-23 RX ORDER — AMLODIPINE BESYLATE AND OLMESARTAN MEDOXOMIL 5; 20 MG/1; MG/1
1 TABLET ORAL
Qty: 90 TABLET | Refills: 0 | Status: SHIPPED | OUTPATIENT
Start: 2025-07-23

## 2025-07-23 NOTE — TELEPHONE ENCOUNTER
Care Due:                  Date            Visit Type   Department     Provider  --------------------------------------------------------------------------------                                ESTABLISHED                              PATIENT -    [facility] PRIMARY  Last Visit: 07-      [facility]           Honey Wyatt  Next Visit: None Scheduled  None         None Found                                                            Last  Test          Frequency    Reason                     Performed    Due Date  --------------------------------------------------------------------------------    Office Visit  15 months..  amlodipine-olmesartan,     07-   10-                             levothyroxine............    TSH.........  12 months..  levothyroxine............  03- 03-    [facility] Embedded Care Due Messages. Reference number: [id number].   7/23/2025 1:01:03 AM CDT

## 2025-07-23 NOTE — TELEPHONE ENCOUNTER
Provider Staff:  Action required for this patient    Requires appointment   Requires labs      Please see care gap opportunities below in Care Due Message.    Thanks!  [facility]     Appointments      Date Provider   Last Visit   7/25/2024 Honey Wyatt MD   Next Visit   Visit date not found Honey Wyatt MD     Refill Decision Note   Augusto Rutledge  is requesting a refill authorization.  Brief Assessment and Rationale for Refill:  Approve     Medication Therapy Plan:         Comments:     Note composed:8:12 AM 07/23/2025

## 2025-08-23 DIAGNOSIS — G43.819 OTHER MIGRAINE WITHOUT STATUS MIGRAINOSUS, INTRACTABLE: ICD-10-CM

## 2025-08-29 RX ORDER — NAPROXEN 500 MG/1
TABLET ORAL
Qty: 60 TABLET | Refills: 2 | OUTPATIENT
Start: 2025-08-29